# Patient Record
Sex: FEMALE | Race: ASIAN | NOT HISPANIC OR LATINO | Employment: UNEMPLOYED | URBAN - METROPOLITAN AREA
[De-identification: names, ages, dates, MRNs, and addresses within clinical notes are randomized per-mention and may not be internally consistent; named-entity substitution may affect disease eponyms.]

---

## 2018-01-01 ENCOUNTER — HOSPITAL ENCOUNTER (OUTPATIENT)
Dept: RADIOLOGY | Facility: HOSPITAL | Age: 53
Discharge: HOME | End: 2018-10-31
Attending: RADIOLOGY

## 2018-01-01 DIAGNOSIS — Z76.89 REFERRAL OF PATIENT WITHOUT EXAMINATION OR TREATMENT: ICD-10-CM

## 2019-01-01 ENCOUNTER — APPOINTMENT (INPATIENT)
Dept: RADIOLOGY | Facility: HOSPITAL | Age: 54
DRG: 208 | End: 2019-01-01
Payer: COMMERCIAL

## 2019-01-01 ENCOUNTER — APPOINTMENT (INPATIENT)
Dept: NON INVASIVE DIAGNOSTICS | Facility: HOSPITAL | Age: 54
DRG: 208 | End: 2019-01-01
Payer: COMMERCIAL

## 2019-01-01 ENCOUNTER — HOSPITAL ENCOUNTER (INPATIENT)
Facility: HOSPITAL | Age: 54
LOS: 3 days | DRG: 208 | End: 2019-07-31
Attending: EMERGENCY MEDICINE | Admitting: INTERNAL MEDICINE
Payer: COMMERCIAL

## 2019-01-01 ENCOUNTER — APPOINTMENT (EMERGENCY)
Dept: RADIOLOGY | Facility: HOSPITAL | Age: 54
DRG: 208 | End: 2019-01-01
Payer: COMMERCIAL

## 2019-01-01 ENCOUNTER — APPOINTMENT (INPATIENT)
Dept: NUCLEAR MEDICINE | Facility: HOSPITAL | Age: 54
DRG: 208 | End: 2019-01-01
Payer: COMMERCIAL

## 2019-01-01 ENCOUNTER — APPOINTMENT (INPATIENT)
Dept: CT IMAGING | Facility: HOSPITAL | Age: 54
DRG: 208 | End: 2019-01-01
Payer: COMMERCIAL

## 2019-01-01 DIAGNOSIS — I46.9 CARDIAC ARREST (HCC): Primary | ICD-10-CM

## 2019-01-01 DIAGNOSIS — J96.90 RESPIRATORY FAILURE (HCC): ICD-10-CM

## 2019-01-01 LAB
ABO GROUP BLD: NORMAL
ALBUMIN SERPL BCP-MCNC: 2.2 G/DL (ref 3.5–5)
ALBUMIN SERPL BCP-MCNC: 2.5 G/DL (ref 3.5–5)
ALBUMIN SERPL BCP-MCNC: 2.5 G/DL (ref 3.5–5)
ALBUMIN SERPL BCP-MCNC: 2.8 G/DL (ref 3.5–5)
ALP SERPL-CCNC: 47 U/L (ref 46–116)
ALP SERPL-CCNC: 55 U/L (ref 46–116)
ALP SERPL-CCNC: 59 U/L (ref 46–116)
ALP SERPL-CCNC: 59 U/L (ref 46–116)
ALT SERPL W P-5'-P-CCNC: 139 U/L (ref 12–78)
ALT SERPL W P-5'-P-CCNC: 149 U/L (ref 12–78)
ALT SERPL W P-5'-P-CCNC: 170 U/L (ref 12–78)
ALT SERPL W P-5'-P-CCNC: 25 U/L (ref 12–78)
ANION GAP BLD CALC-SCNC: 20 MMOL/L (ref 4–13)
ANION GAP SERPL CALCULATED.3IONS-SCNC: 10 MMOL/L (ref 4–13)
ANION GAP SERPL CALCULATED.3IONS-SCNC: 14 MMOL/L (ref 4–13)
ANION GAP SERPL CALCULATED.3IONS-SCNC: 14 MMOL/L (ref 4–13)
ANION GAP SERPL CALCULATED.3IONS-SCNC: 15 MMOL/L (ref 4–13)
ANION GAP SERPL CALCULATED.3IONS-SCNC: 21 MMOL/L (ref 4–13)
ANION GAP SERPL CALCULATED.3IONS-SCNC: 7 MMOL/L (ref 4–13)
ANION GAP SERPL CALCULATED.3IONS-SCNC: 8 MMOL/L (ref 4–13)
ANION GAP SERPL CALCULATED.3IONS-SCNC: 9 MMOL/L (ref 4–13)
ANION GAP SERPL CALCULATED.3IONS-SCNC: 9 MMOL/L (ref 4–13)
APTT PPP: 29 SECONDS (ref 23–37)
APTT PPP: 32 SECONDS (ref 23–37)
APTT PPP: 32 SECONDS (ref 23–37)
APTT PPP: 41 SECONDS (ref 23–37)
ARTERIAL PATENCY WRIST A: YES
AST SERPL W P-5'-P-CCNC: 152 U/L (ref 5–45)
AST SERPL W P-5'-P-CCNC: 254 U/L (ref 5–45)
AST SERPL W P-5'-P-CCNC: 281 U/L (ref 5–45)
AST SERPL W P-5'-P-CCNC: 31 U/L (ref 5–45)
ATRIAL RATE: 100 BPM
ATRIAL RATE: 102 BPM
ATRIAL RATE: 104 BPM
ATRIAL RATE: 119 BPM
ATRIAL RATE: 127 BPM
ATRIAL RATE: 136 BPM
ATRIAL RATE: 139 BPM
ATRIAL RATE: 78 BPM
ATRIAL RATE: 86 BPM
ATRIAL RATE: 93 BPM
BASE EXCESS BLDA CALC-SCNC: -0.1 MMOL/L
BASE EXCESS BLDA CALC-SCNC: -23.2 MMOL/L
BASE EXCESS BLDA CALC-SCNC: -24 MMOL/L (ref -2–3)
BASE EXCESS BLDA CALC-SCNC: -4.4 MMOL/L
BASE EXCESS BLDA CALC-SCNC: 0.1 MMOL/L
BASE EXCESS BLDA CALC-SCNC: 2.2 MMOL/L
BASE EXCESS BLDA CALC-SCNC: 2.7 MMOL/L
BASE EXCESS BLDA CALC-SCNC: 3 MMOL/L (ref -2–3)
BASE EXCESS BLDA CALC-SCNC: 3.4 MMOL/L
BASE EXCESS BLDA CALC-SCNC: 4.9 MMOL/L
BASE EXCESS BLDA CALC-SCNC: 5 MMOL/L (ref -2–3)
BASE EXCESS BLDA CALC-SCNC: 5.4 MMOL/L
BASOPHILS # BLD AUTO: 0.05 THOUSANDS/ΜL (ref 0–0.1)
BASOPHILS # BLD AUTO: 0.05 THOUSANDS/ΜL (ref 0–0.1)
BASOPHILS NFR BLD AUTO: 0 % (ref 0–1)
BASOPHILS NFR BLD AUTO: 0 % (ref 0–1)
BILIRUB SERPL-MCNC: 0.1 MG/DL (ref 0.2–1)
BILIRUB SERPL-MCNC: 0.15 MG/DL (ref 0.2–1)
BILIRUB SERPL-MCNC: 0.26 MG/DL (ref 0.2–1)
BILIRUB SERPL-MCNC: 0.27 MG/DL (ref 0.2–1)
BLD GP AB SCN SERPL QL: NEGATIVE
BODY TEMPERATURE: 93.4 DEGREES FEHRENHEIT
BODY TEMPERATURE: 93.6 DEGREES FEHRENHEIT
BODY TEMPERATURE: 93.6 DEGREES FEHRENHEIT
BODY TEMPERATURE: 94.1 DEGREES FEHRENHEIT
BUN BLD-MCNC: 23 MG/DL (ref 5–25)
BUN SERPL-MCNC: 15 MG/DL (ref 5–25)
BUN SERPL-MCNC: 17 MG/DL (ref 5–25)
BUN SERPL-MCNC: 19 MG/DL (ref 5–25)
BUN SERPL-MCNC: 19 MG/DL (ref 5–25)
BUN SERPL-MCNC: 20 MG/DL (ref 5–25)
BUN SERPL-MCNC: 20 MG/DL (ref 5–25)
BUN SERPL-MCNC: 21 MG/DL (ref 5–25)
BUN SERPL-MCNC: 22 MG/DL (ref 5–25)
BUN SERPL-MCNC: 23 MG/DL (ref 5–25)
CA-I BLD-SCNC: 0.84 MMOL/L (ref 1.12–1.32)
CA-I BLD-SCNC: 0.9 MMOL/L (ref 1.12–1.32)
CA-I BLD-SCNC: 0.9 MMOL/L (ref 1.12–1.32)
CA-I BLD-SCNC: 1.07 MMOL/L (ref 1.12–1.32)
CA-I BLD-SCNC: 1.11 MMOL/L (ref 1.12–1.32)
CA-I BLD-SCNC: 1.15 MMOL/L (ref 1.12–1.32)
CA-I BLD-SCNC: 1.17 MMOL/L (ref 1.12–1.32)
CALCIUM SERPL-MCNC: 6.4 MG/DL (ref 8.3–10.1)
CALCIUM SERPL-MCNC: 6.8 MG/DL (ref 8.3–10.1)
CALCIUM SERPL-MCNC: 6.9 MG/DL (ref 8.3–10.1)
CALCIUM SERPL-MCNC: 7.1 MG/DL (ref 8.3–10.1)
CALCIUM SERPL-MCNC: 7.8 MG/DL (ref 8.3–10.1)
CALCIUM SERPL-MCNC: 7.8 MG/DL (ref 8.3–10.1)
CALCIUM SERPL-MCNC: 8.2 MG/DL (ref 8.3–10.1)
CALCIUM SERPL-MCNC: 8.5 MG/DL (ref 8.3–10.1)
CALCIUM SERPL-MCNC: 9 MG/DL (ref 8.3–10.1)
CHLORIDE BLD-SCNC: 107 MMOL/L (ref 100–108)
CHLORIDE SERPL-SCNC: 102 MMOL/L (ref 100–108)
CHLORIDE SERPL-SCNC: 103 MMOL/L (ref 100–108)
CHLORIDE SERPL-SCNC: 103 MMOL/L (ref 100–108)
CHLORIDE SERPL-SCNC: 105 MMOL/L (ref 100–108)
CHLORIDE SERPL-SCNC: 106 MMOL/L (ref 100–108)
CHLORIDE SERPL-SCNC: 107 MMOL/L (ref 100–108)
CHLORIDE SERPL-SCNC: 108 MMOL/L (ref 100–108)
CHLORIDE SERPL-SCNC: 110 MMOL/L (ref 100–108)
CHLORIDE SERPL-SCNC: 129 MMOL/L (ref 100–108)
CO2 SERPL-SCNC: 14 MMOL/L (ref 21–32)
CO2 SERPL-SCNC: 24 MMOL/L (ref 21–32)
CO2 SERPL-SCNC: 25 MMOL/L (ref 21–32)
CO2 SERPL-SCNC: 27 MMOL/L (ref 21–32)
CO2 SERPL-SCNC: 28 MMOL/L (ref 21–32)
CO2 SERPL-SCNC: 29 MMOL/L (ref 21–32)
CO2 SERPL-SCNC: 29 MMOL/L (ref 21–32)
CO2 SERPL-SCNC: 30 MMOL/L (ref 21–32)
CO2 SERPL-SCNC: 30 MMOL/L (ref 21–32)
CREAT BLD-MCNC: 1.2 MG/DL (ref 0.6–1.3)
CREAT SERPL-MCNC: 0.88 MG/DL (ref 0.6–1.3)
CREAT SERPL-MCNC: 1.01 MG/DL (ref 0.6–1.3)
CREAT SERPL-MCNC: 1.22 MG/DL (ref 0.6–1.3)
CREAT SERPL-MCNC: 1.23 MG/DL (ref 0.6–1.3)
CREAT SERPL-MCNC: 1.29 MG/DL (ref 0.6–1.3)
CREAT SERPL-MCNC: 1.5 MG/DL (ref 0.6–1.3)
CREAT SERPL-MCNC: 1.53 MG/DL (ref 0.6–1.3)
CREAT SERPL-MCNC: 1.58 MG/DL (ref 0.6–1.3)
CREAT SERPL-MCNC: 1.8 MG/DL (ref 0.6–1.3)
DS:DELIVERY SYSTEM: 18
DS:DELIVERY SYSTEM: 18
EOSINOPHIL # BLD AUTO: 0.08 THOUSAND/ΜL (ref 0–0.61)
EOSINOPHIL # BLD AUTO: 1.26 THOUSAND/ΜL (ref 0–0.61)
EOSINOPHIL NFR BLD AUTO: 0 % (ref 0–6)
EOSINOPHIL NFR BLD AUTO: 10 % (ref 0–6)
ERYTHROCYTE [DISTWIDTH] IN BLOOD BY AUTOMATED COUNT: 14.4 % (ref 11.6–15.1)
ERYTHROCYTE [DISTWIDTH] IN BLOOD BY AUTOMATED COUNT: 14.5 % (ref 11.6–15.1)
ERYTHROCYTE [DISTWIDTH] IN BLOOD BY AUTOMATED COUNT: 14.5 % (ref 11.6–15.1)
FIO2 GAS DIL.REBREATH: 100 L
FIO2 GAS DIL.REBREATH: 100 L
GFR SERPL CREATININE-BSD FRML MDRD: 31 ML/MIN/1.73SQ M
GFR SERPL CREATININE-BSD FRML MDRD: 37 ML/MIN/1.73SQ M
GFR SERPL CREATININE-BSD FRML MDRD: 38 ML/MIN/1.73SQ M
GFR SERPL CREATININE-BSD FRML MDRD: 39 ML/MIN/1.73SQ M
GFR SERPL CREATININE-BSD FRML MDRD: 47 ML/MIN/1.73SQ M
GFR SERPL CREATININE-BSD FRML MDRD: 50 ML/MIN/1.73SQ M
GFR SERPL CREATININE-BSD FRML MDRD: 50 ML/MIN/1.73SQ M
GFR SERPL CREATININE-BSD FRML MDRD: 51 ML/MIN/1.73SQ M
GFR SERPL CREATININE-BSD FRML MDRD: 63 ML/MIN/1.73SQ M
GFR SERPL CREATININE-BSD FRML MDRD: 75 ML/MIN/1.73SQ M
GLUCOSE SERPL-MCNC: 101 MG/DL (ref 65–140)
GLUCOSE SERPL-MCNC: 102 MG/DL (ref 65–140)
GLUCOSE SERPL-MCNC: 103 MG/DL (ref 65–140)
GLUCOSE SERPL-MCNC: 105 MG/DL (ref 65–140)
GLUCOSE SERPL-MCNC: 120 MG/DL (ref 65–140)
GLUCOSE SERPL-MCNC: 125 MG/DL (ref 65–140)
GLUCOSE SERPL-MCNC: 137 MG/DL (ref 65–140)
GLUCOSE SERPL-MCNC: 139 MG/DL (ref 65–140)
GLUCOSE SERPL-MCNC: 141 MG/DL (ref 65–140)
GLUCOSE SERPL-MCNC: 146 MG/DL (ref 65–140)
GLUCOSE SERPL-MCNC: 147 MG/DL (ref 65–140)
GLUCOSE SERPL-MCNC: 163 MG/DL (ref 65–140)
GLUCOSE SERPL-MCNC: 167 MG/DL (ref 65–140)
GLUCOSE SERPL-MCNC: 168 MG/DL (ref 65–140)
GLUCOSE SERPL-MCNC: 173 MG/DL (ref 65–140)
GLUCOSE SERPL-MCNC: 174 MG/DL (ref 65–140)
GLUCOSE SERPL-MCNC: 176 MG/DL (ref 65–140)
GLUCOSE SERPL-MCNC: 179 MG/DL (ref 65–140)
GLUCOSE SERPL-MCNC: 179 MG/DL (ref 65–140)
GLUCOSE SERPL-MCNC: 183 MG/DL (ref 65–140)
GLUCOSE SERPL-MCNC: 184 MG/DL (ref 65–140)
GLUCOSE SERPL-MCNC: 185 MG/DL (ref 65–140)
GLUCOSE SERPL-MCNC: 193 MG/DL (ref 65–140)
GLUCOSE SERPL-MCNC: 196 MG/DL (ref 65–140)
GLUCOSE SERPL-MCNC: 217 MG/DL (ref 65–140)
GLUCOSE SERPL-MCNC: 273 MG/DL (ref 65–140)
GLUCOSE SERPL-MCNC: 343 MG/DL (ref 65–140)
GLUCOSE SERPL-MCNC: 351 MG/DL (ref 65–140)
GLUCOSE SERPL-MCNC: 356 MG/DL (ref 65–140)
GLUCOSE SERPL-MCNC: 386 MG/DL (ref 65–140)
GLUCOSE SERPL-MCNC: 418 MG/DL (ref 65–140)
GLUCOSE SERPL-MCNC: 454 MG/DL (ref 65–140)
GLUCOSE SERPL-MCNC: 47 MG/DL (ref 65–140)
HCO3 BLDA-SCNC: 10.6 MMOL/L (ref 22–28)
HCO3 BLDA-SCNC: 11 MMOL/L (ref 24–30)
HCO3 BLDA-SCNC: 22.1 MMOL/L (ref 22–28)
HCO3 BLDA-SCNC: 24.5 MMOL/L (ref 22–28)
HCO3 BLDA-SCNC: 25.4 MMOL/L (ref 22–28)
HCO3 BLDA-SCNC: 26 MMOL/L (ref 22–28)
HCO3 BLDA-SCNC: 27.1 MMOL/L (ref 22–28)
HCO3 BLDA-SCNC: 28.7 MMOL/L (ref 22–28)
HCO3 BLDA-SCNC: 29.2 MMOL/L (ref 22–28)
HCO3 BLDA-SCNC: 30.3 MMOL/L (ref 22–28)
HCO3 BLDA-SCNC: 30.6 MMOL/L (ref 22–28)
HCO3 BLDA-SCNC: 32.3 MMOL/L (ref 22–28)
HCT VFR BLD AUTO: 41.6 % (ref 34.8–46.1)
HCT VFR BLD AUTO: 42.9 % (ref 34.8–46.1)
HCT VFR BLD AUTO: 44.1 % (ref 34.8–46.1)
HCT VFR BLD CALC: 35 % (ref 34.8–46.1)
HCT VFR BLD CALC: 36 % (ref 34.8–46.1)
HCT VFR BLD CALC: 40 % (ref 34.8–46.1)
HGB BLD-MCNC: 11.4 G/DL (ref 11.5–15.4)
HGB BLD-MCNC: 12.6 G/DL (ref 11.5–15.4)
HGB BLD-MCNC: 13 G/DL (ref 11.5–15.4)
HGB BLDA-MCNC: 11.9 G/DL (ref 11.5–15.4)
HGB BLDA-MCNC: 12.2 G/DL (ref 11.5–15.4)
HGB BLDA-MCNC: 13.6 G/DL (ref 11.5–15.4)
HOROWITZ INDEX BLDA+IHG-RTO: 100 MM[HG]
HOROWITZ INDEX BLDA+IHG-RTO: 40 MM[HG]
HOROWITZ INDEX BLDA+IHG-RTO: 50 MM[HG]
HOROWITZ INDEX BLDA+IHG-RTO: 80 MM[HG]
IMM GRANULOCYTES # BLD AUTO: 0.2 THOUSAND/UL (ref 0–0.2)
IMM GRANULOCYTES # BLD AUTO: 0.38 THOUSAND/UL (ref 0–0.2)
IMM GRANULOCYTES NFR BLD AUTO: 2 % (ref 0–2)
IMM GRANULOCYTES NFR BLD AUTO: 2 % (ref 0–2)
INR PPP: 0.85 (ref 0.84–1.19)
INR PPP: 0.87 (ref 0.84–1.19)
INR PPP: 0.93 (ref 0.84–1.19)
INR PPP: 0.97 (ref 0.84–1.19)
INR PPP: 1.06 (ref 0.84–1.19)
INR PPP: 1.29 (ref 0.84–1.19)
LACTATE SERPL-SCNC: 1.7 MMOL/L (ref 0.5–2)
LACTATE SERPL-SCNC: 14.4 MMOL/L (ref 0.5–2)
LACTATE SERPL-SCNC: 3.7 MMOL/L (ref 0.5–2)
LACTATE SERPL-SCNC: 4.6 MMOL/L (ref 0.5–2)
LACTATE SERPL-SCNC: 6.8 MMOL/L (ref 0.5–2)
LYMPHOCYTES # BLD AUTO: 0.63 THOUSANDS/ΜL (ref 0.6–4.47)
LYMPHOCYTES # BLD AUTO: 5.58 THOUSANDS/ΜL (ref 0.6–4.47)
LYMPHOCYTES NFR BLD AUTO: 3 % (ref 14–44)
LYMPHOCYTES NFR BLD AUTO: 45 % (ref 14–44)
MAGNESIUM SERPL-MCNC: 2 MG/DL (ref 1.6–2.6)
MAGNESIUM SERPL-MCNC: 2 MG/DL (ref 1.6–2.6)
MAGNESIUM SERPL-MCNC: 2.2 MG/DL (ref 1.6–2.6)
MAGNESIUM SERPL-MCNC: 2.3 MG/DL (ref 1.6–2.6)
MAGNESIUM SERPL-MCNC: 2.7 MG/DL (ref 1.6–2.6)
MAGNESIUM SERPL-MCNC: 2.7 MG/DL (ref 1.6–2.6)
MAGNESIUM SERPL-MCNC: 3.1 MG/DL (ref 1.6–2.6)
MCH RBC QN AUTO: 22 PG (ref 26.8–34.3)
MCHC RBC AUTO-ENTMCNC: 26.6 G/DL (ref 31.4–37.4)
MCHC RBC AUTO-ENTMCNC: 29.5 G/DL (ref 31.4–37.4)
MCHC RBC AUTO-ENTMCNC: 30.3 G/DL (ref 31.4–37.4)
MCV RBC AUTO: 73 FL (ref 82–98)
MCV RBC AUTO: 75 FL (ref 82–98)
MCV RBC AUTO: 83 FL (ref 82–98)
MONOCYTES # BLD AUTO: 0.48 THOUSAND/ΜL (ref 0.17–1.22)
MONOCYTES # BLD AUTO: 0.6 THOUSAND/ΜL (ref 0.17–1.22)
MONOCYTES NFR BLD AUTO: 3 % (ref 4–12)
MONOCYTES NFR BLD AUTO: 4 % (ref 4–12)
NEUTROPHILS # BLD AUTO: 22.7 THOUSANDS/ΜL (ref 1.85–7.62)
NEUTROPHILS # BLD AUTO: 4.84 THOUSANDS/ΜL (ref 1.85–7.62)
NEUTS SEG NFR BLD AUTO: 39 % (ref 43–75)
NEUTS SEG NFR BLD AUTO: 92 % (ref 43–75)
NRBC BLD AUTO-RTO: 0 /100 WBCS
NRBC BLD AUTO-RTO: 1 /100 WBCS
NT-PROBNP SERPL-MCNC: 183 PG/ML
O2 CT BLDA-SCNC: 16.1 ML/DL (ref 16–23)
O2 CT BLDA-SCNC: 16.5 ML/DL (ref 16–23)
O2 CT BLDA-SCNC: 17.7 ML/DL (ref 16–23)
O2 CT BLDA-SCNC: 17.8 ML/DL (ref 16–23)
O2 CT BLDA-SCNC: 18.1 ML/DL (ref 16–23)
O2 CT BLDA-SCNC: 18.3 ML/DL (ref 16–23)
O2 CT BLDA-SCNC: 18.4 ML/DL (ref 16–23)
O2 CT BLDA-SCNC: 18.7 ML/DL (ref 16–23)
O2 CT BLDA-SCNC: 19.3 ML/DL (ref 16–23)
OXYHGB MFR BLDA: 94.3 % (ref 94–97)
OXYHGB MFR BLDA: 94.3 % (ref 94–97)
OXYHGB MFR BLDA: 95.6 % (ref 94–97)
OXYHGB MFR BLDA: 96.7 % (ref 94–97)
OXYHGB MFR BLDA: 97.3 % (ref 94–97)
OXYHGB MFR BLDA: 97.3 % (ref 94–97)
OXYHGB MFR BLDA: 97.7 % (ref 94–97)
OXYHGB MFR BLDA: 97.9 % (ref 94–97)
OXYHGB MFR BLDA: 97.9 % (ref 94–97)
P AXIS: 0 DEGREES
P AXIS: 59 DEGREES
P AXIS: 63 DEGREES
P AXIS: 64 DEGREES
P AXIS: 65 DEGREES
P AXIS: 67 DEGREES
P AXIS: 70 DEGREES
P AXIS: 74 DEGREES
P AXIS: 78 DEGREES
P AXIS: 80 DEGREES
PCO2 BLD: 13 MMOL/L (ref 21–32)
PCO2 BLD: 15 MMOL/L (ref 21–32)
PCO2 BLD: 32 MMOL/L (ref 21–32)
PCO2 BLD: 35 MMOL/L (ref 21–32)
PCO2 BLD: 49.8 MM HG (ref 36–44)
PCO2 BLD: 65.7 MM HG (ref 42–50)
PCO2 BLD: 74.8 MM HG (ref 36–44)
PCO2 BLDA: 33.2 MM HG (ref 36–44)
PCO2 BLDA: 38.8 MM HG (ref 36–44)
PCO2 BLDA: 41.8 MM HG (ref 36–44)
PCO2 BLDA: 42.9 MM HG (ref 36–44)
PCO2 BLDA: 45.8 MM HG (ref 36–44)
PCO2 BLDA: 46.2 MM HG (ref 36–44)
PCO2 BLDA: 46.7 MM HG (ref 36–44)
PCO2 BLDA: 47.7 MM HG (ref 36–44)
PCO2 BLDA: 62.3 MM HG (ref 36–44)
PCO2 TEMP ADJ BLDA: 29.4 MM HG (ref 36–44)
PCO2 TEMP ADJ BLDA: 37 MM HG (ref 36–44)
PCO2 TEMP ADJ BLDA: 41.1 MM HG (ref 36–44)
PEEP RESPIRATORY: 5 CM[H2O]
PH BLD: 6.83 [PH] (ref 7.3–7.4)
PH BLD: 7.24 [PH] (ref 7.35–7.45)
PH BLD: 7.4 [PH] (ref 7.35–7.45)
PH BLD: 7.48 [PH] (ref 7.35–7.45)
PH BLD: 7.5 [PH] (ref 7.35–7.45)
PH BLD: 7.54 [PH] (ref 7.35–7.45)
PH BLDA: 6.85 [PH] (ref 7.35–7.45)
PH BLDA: 7.3 [PH] (ref 7.35–7.45)
PH BLDA: 7.35 [PH] (ref 7.35–7.45)
PH BLDA: 7.41 [PH] (ref 7.35–7.45)
PH BLDA: 7.42 [PH] (ref 7.35–7.45)
PH BLDA: 7.42 [PH] (ref 7.35–7.45)
PH BLDA: 7.44 [PH] (ref 7.35–7.45)
PH BLDA: 7.46 [PH] (ref 7.35–7.45)
PH BLDA: 7.5 [PH] (ref 7.35–7.45)
PHOSPHATE SERPL-MCNC: 1.9 MG/DL (ref 2.7–4.5)
PHOSPHATE SERPL-MCNC: 2.9 MG/DL (ref 2.7–4.5)
PHOSPHATE SERPL-MCNC: 5 MG/DL (ref 2.7–4.5)
PHOSPHATE SERPL-MCNC: 5.2 MG/DL (ref 2.7–4.5)
PHOSPHATE SERPL-MCNC: 5.7 MG/DL (ref 2.7–4.5)
PLATELET # BLD AUTO: 248 THOUSANDS/UL (ref 149–390)
PLATELET # BLD AUTO: 260 THOUSANDS/UL (ref 149–390)
PLATELET # BLD AUTO: 265 THOUSANDS/UL (ref 149–390)
PLATELET # BLD AUTO: 324 THOUSANDS/UL (ref 149–390)
PMV BLD AUTO: 10.3 FL (ref 8.9–12.7)
PMV BLD AUTO: 11.7 FL (ref 8.9–12.7)
PMV BLD AUTO: 11.8 FL (ref 8.9–12.7)
PMV BLD AUTO: 12.8 FL (ref 8.9–12.7)
PO2 BLD: 116.8 MM HG (ref 75–129)
PO2 BLD: 119.8 MM HG (ref 75–129)
PO2 BLD: 144.5 MM HG (ref 75–129)
PO2 BLD: 268 MM HG (ref 75–129)
PO2 BLD: 343 MM HG (ref 75–129)
PO2 BLD: 47 MM HG (ref 35–45)
PO2 BLDA: 120.7 MM HG (ref 75–129)
PO2 BLDA: 131.4 MM HG (ref 75–129)
PO2 BLDA: 136 MM HG (ref 75–129)
PO2 BLDA: 144.5 MM HG (ref 75–129)
PO2 BLDA: 159.9 MM HG (ref 75–129)
PO2 BLDA: 223.2 MM HG (ref 75–129)
PO2 BLDA: 229.9 MM HG (ref 75–129)
PO2 BLDA: 85.9 MM HG (ref 75–129)
PO2 BLDA: 97.7 MM HG (ref 75–129)
POTASSIUM BLD-SCNC: 4 MMOL/L (ref 3.5–5.3)
POTASSIUM BLD-SCNC: 4.3 MMOL/L (ref 3.5–5.3)
POTASSIUM BLD-SCNC: 5.5 MMOL/L (ref 3.5–5.3)
POTASSIUM SERPL-SCNC: 2.9 MMOL/L (ref 3.5–5.3)
POTASSIUM SERPL-SCNC: 3.1 MMOL/L (ref 3.5–5.3)
POTASSIUM SERPL-SCNC: 3.6 MMOL/L (ref 3.5–5.3)
POTASSIUM SERPL-SCNC: 3.8 MMOL/L (ref 3.5–5.3)
POTASSIUM SERPL-SCNC: 4.1 MMOL/L (ref 3.5–5.3)
POTASSIUM SERPL-SCNC: 4.3 MMOL/L (ref 3.5–5.3)
POTASSIUM SERPL-SCNC: 4.5 MMOL/L (ref 3.5–5.3)
POTASSIUM SERPL-SCNC: 4.5 MMOL/L (ref 3.5–5.3)
POTASSIUM SERPL-SCNC: 5.8 MMOL/L (ref 3.5–5.3)
PR INTERVAL: 113 MS
PR INTERVAL: 114 MS
PR INTERVAL: 117 MS
PR INTERVAL: 118 MS
PR INTERVAL: 138 MS
PR INTERVAL: 146 MS
PR INTERVAL: 175 MS
PR INTERVAL: 232 MS
PROCALCITONIN SERPL-MCNC: 2.7 NG/ML
PROT SERPL-MCNC: 5.1 G/DL (ref 6.4–8.2)
PROT SERPL-MCNC: 5.2 G/DL (ref 6.4–8.2)
PROT SERPL-MCNC: 5.7 G/DL (ref 6.4–8.2)
PROT SERPL-MCNC: 5.7 G/DL (ref 6.4–8.2)
PROTHROMBIN TIME: 11.7 SECONDS (ref 11.6–14.5)
PROTHROMBIN TIME: 11.9 SECONDS (ref 11.6–14.5)
PROTHROMBIN TIME: 12.6 SECONDS (ref 11.6–14.5)
PROTHROMBIN TIME: 13 SECONDS (ref 11.6–14.5)
PROTHROMBIN TIME: 13.9 SECONDS (ref 11.6–14.5)
PROTHROMBIN TIME: 16.3 SECONDS (ref 11.6–14.5)
PS VENT FIO2: 40
PS VENT PEEP: 5
QRS AXIS: 10 DEGREES
QRS AXIS: 102 DEGREES
QRS AXIS: 203 DEGREES
QRS AXIS: 34 DEGREES
QRS AXIS: 36 DEGREES
QRS AXIS: 37 DEGREES
QRS AXIS: 41 DEGREES
QRS AXIS: 41 DEGREES
QRS AXIS: 55 DEGREES
QRS AXIS: 82 DEGREES
QRSD INTERVAL: 104 MS
QRSD INTERVAL: 134 MS
QRSD INTERVAL: 72 MS
QRSD INTERVAL: 79 MS
QRSD INTERVAL: 82 MS
QRSD INTERVAL: 88 MS
QRSD INTERVAL: 88 MS
QRSD INTERVAL: 96 MS
QT INTERVAL: 275 MS
QT INTERVAL: 280 MS
QT INTERVAL: 308 MS
QT INTERVAL: 313 MS
QT INTERVAL: 314 MS
QT INTERVAL: 328 MS
QT INTERVAL: 392 MS
QT INTERVAL: 517 MS
QT INTERVAL: 529 MS
QT INTERVAL: 571 MS
QTC INTERVAL: 404 MS
QTC INTERVAL: 406 MS
QTC INTERVAL: 414 MS
QTC INTERVAL: 415 MS
QTC INTERVAL: 426 MS
QTC INTERVAL: 476 MS
QTC INTERVAL: 511 MS
QTC INTERVAL: 619 MS
QTC INTERVAL: 651 MS
QTC INTERVAL: 659 MS
RBC # BLD AUTO: 5.18 MILLION/UL (ref 3.81–5.12)
RBC # BLD AUTO: 5.72 MILLION/UL (ref 3.81–5.12)
RBC # BLD AUTO: 5.9 MILLION/UL (ref 3.81–5.12)
RH BLD: POSITIVE
SAO2 % BLD FROM PO2: 100 % (ref 95–98)
SAO2 % BLD FROM PO2: 100 % (ref 95–98)
SAO2 % BLD FROM PO2: 48 % (ref 95–98)
SODIUM BLD-SCNC: 135 MMOL/L (ref 136–145)
SODIUM BLD-SCNC: 153 MMOL/L (ref 136–145)
SODIUM BLD-SCNC: 156 MMOL/L (ref 136–145)
SODIUM SERPL-SCNC: 139 MMOL/L (ref 136–145)
SODIUM SERPL-SCNC: 140 MMOL/L (ref 136–145)
SODIUM SERPL-SCNC: 141 MMOL/L (ref 136–145)
SODIUM SERPL-SCNC: 142 MMOL/L (ref 136–145)
SODIUM SERPL-SCNC: 145 MMOL/L (ref 136–145)
SODIUM SERPL-SCNC: 147 MMOL/L (ref 136–145)
SODIUM SERPL-SCNC: 148 MMOL/L (ref 136–145)
SODIUM SERPL-SCNC: 149 MMOL/L (ref 136–145)
SODIUM SERPL-SCNC: 165 MMOL/L (ref 136–145)
SPECIMEN EXPIRATION DATE: NORMAL
SPECIMEN SOURCE: ABNORMAL
SPECIMEN SOURCE: NORMAL
T WAVE AXIS: 124 DEGREES
T WAVE AXIS: 179 DEGREES
T WAVE AXIS: 200 DEGREES
T WAVE AXIS: 210 DEGREES
T WAVE AXIS: 212 DEGREES
T WAVE AXIS: 213 DEGREES
T WAVE AXIS: 257 DEGREES
T WAVE AXIS: 270 DEGREES
T WAVE AXIS: 46 DEGREES
T WAVE AXIS: 52 DEGREES
TROPONIN I SERPL-MCNC: <0.02 NG/ML
VENT - PS: ABNORMAL
VENT AC: 18
VENT AC: 18
VENT AC: 22
VENT AC: 28
VENT- AC: AC
VENTILATION VALUE: 380
VENTILATION VALUE: 380
VENTRICULAR RATE: 100 BPM
VENTRICULAR RATE: 102 BPM
VENTRICULAR RATE: 104 BPM
VENTRICULAR RATE: 105 BPM
VENTRICULAR RATE: 127 BPM
VENTRICULAR RATE: 136 BPM
VENTRICULAR RATE: 139 BPM
VENTRICULAR RATE: 78 BPM
VENTRICULAR RATE: 86 BPM
VENTRICULAR RATE: 93 BPM
VT SETTING VENT: 380 ML
VT SETTING VENT: 380 ML
VT SETTING VENT: 400 ML
VT SETTING VENT: 450 ML
WBC # BLD AUTO: 12.41 THOUSAND/UL (ref 4.31–10.16)
WBC # BLD AUTO: 24.44 THOUSAND/UL (ref 4.31–10.16)
WBC # BLD AUTO: 26.73 THOUSAND/UL (ref 4.31–10.16)

## 2019-01-01 PROCEDURE — 80047 BASIC METABLC PNL IONIZED CA: CPT

## 2019-01-01 PROCEDURE — 36415 COLL VENOUS BLD VENIPUNCTURE: CPT | Performed by: EMERGENCY MEDICINE

## 2019-01-01 PROCEDURE — 96375 TX/PRO/DX INJ NEW DRUG ADDON: CPT

## 2019-01-01 PROCEDURE — 83735 ASSAY OF MAGNESIUM: CPT | Performed by: PHYSICIAN ASSISTANT

## 2019-01-01 PROCEDURE — 83605 ASSAY OF LACTIC ACID: CPT | Performed by: PHYSICIAN ASSISTANT

## 2019-01-01 PROCEDURE — 82948 REAGENT STRIP/BLOOD GLUCOSE: CPT

## 2019-01-01 PROCEDURE — 82947 ASSAY GLUCOSE BLOOD QUANT: CPT

## 2019-01-01 PROCEDURE — 86850 RBC ANTIBODY SCREEN: CPT | Performed by: NURSE PRACTITIONER

## 2019-01-01 PROCEDURE — 03HY32Z INSERTION OF MONITORING DEVICE INTO UPPER ARTERY, PERCUTANEOUS APPROACH: ICD-10-PCS | Performed by: INTERNAL MEDICINE

## 2019-01-01 PROCEDURE — 85730 THROMBOPLASTIN TIME PARTIAL: CPT | Performed by: PHYSICIAN ASSISTANT

## 2019-01-01 PROCEDURE — 5A1945Z RESPIRATORY VENTILATION, 24-96 CONSECUTIVE HOURS: ICD-10-PCS | Performed by: EMERGENCY MEDICINE

## 2019-01-01 PROCEDURE — 4A133B1 MONITORING OF ARTERIAL PRESSURE, PERIPHERAL, PERCUTANEOUS APPROACH: ICD-10-PCS | Performed by: INTERNAL MEDICINE

## 2019-01-01 PROCEDURE — 85025 COMPLETE CBC W/AUTO DIFF WBC: CPT | Performed by: EMERGENCY MEDICINE

## 2019-01-01 PROCEDURE — 93010 ELECTROCARDIOGRAM REPORT: CPT | Performed by: INTERNAL MEDICINE

## 2019-01-01 PROCEDURE — 87040 BLOOD CULTURE FOR BACTERIA: CPT | Performed by: EMERGENCY MEDICINE

## 2019-01-01 PROCEDURE — 82805 BLOOD GASES W/O2 SATURATION: CPT | Performed by: INTERNAL MEDICINE

## 2019-01-01 PROCEDURE — 85610 PROTHROMBIN TIME: CPT | Performed by: INTERNAL MEDICINE

## 2019-01-01 PROCEDURE — 4A133J1 MONITORING OF ARTERIAL PULSE, PERIPHERAL, PERCUTANEOUS APPROACH: ICD-10-PCS | Performed by: INTERNAL MEDICINE

## 2019-01-01 PROCEDURE — 84145 PROCALCITONIN (PCT): CPT | Performed by: PHYSICIAN ASSISTANT

## 2019-01-01 PROCEDURE — 71045 X-RAY EXAM CHEST 1 VIEW: CPT

## 2019-01-01 PROCEDURE — 94644 CONT INHLJ TX 1ST HOUR: CPT

## 2019-01-01 PROCEDURE — 93005 ELECTROCARDIOGRAM TRACING: CPT

## 2019-01-01 PROCEDURE — NC001 PR NO CHARGE: Performed by: NURSE PRACTITIONER

## 2019-01-01 PROCEDURE — 80048 BASIC METABOLIC PNL TOTAL CA: CPT | Performed by: PHYSICIAN ASSISTANT

## 2019-01-01 PROCEDURE — 80053 COMPREHEN METABOLIC PANEL: CPT | Performed by: EMERGENCY MEDICINE

## 2019-01-01 PROCEDURE — 99292 CRITICAL CARE ADDL 30 MIN: CPT | Performed by: INTERNAL MEDICINE

## 2019-01-01 PROCEDURE — 84295 ASSAY OF SERUM SODIUM: CPT

## 2019-01-01 PROCEDURE — 99285 EMERGENCY DEPT VISIT HI MDM: CPT | Performed by: EMERGENCY MEDICINE

## 2019-01-01 PROCEDURE — 84100 ASSAY OF PHOSPHORUS: CPT | Performed by: PHYSICIAN ASSISTANT

## 2019-01-01 PROCEDURE — 96365 THER/PROPH/DIAG IV INF INIT: CPT

## 2019-01-01 PROCEDURE — 82330 ASSAY OF CALCIUM: CPT | Performed by: PHYSICIAN ASSISTANT

## 2019-01-01 PROCEDURE — 70450 CT HEAD/BRAIN W/O DYE: CPT

## 2019-01-01 PROCEDURE — 85610 PROTHROMBIN TIME: CPT | Performed by: EMERGENCY MEDICINE

## 2019-01-01 PROCEDURE — 85730 THROMBOPLASTIN TIME PARTIAL: CPT | Performed by: EMERGENCY MEDICINE

## 2019-01-01 PROCEDURE — 36620 INSERTION CATHETER ARTERY: CPT

## 2019-01-01 PROCEDURE — 85049 AUTOMATED PLATELET COUNT: CPT | Performed by: PHYSICIAN ASSISTANT

## 2019-01-01 PROCEDURE — 83605 ASSAY OF LACTIC ACID: CPT | Performed by: EMERGENCY MEDICINE

## 2019-01-01 PROCEDURE — 36620 INSERTION CATHETER ARTERY: CPT | Performed by: NURSE PRACTITIONER

## 2019-01-01 PROCEDURE — 86920 COMPATIBILITY TEST SPIN: CPT

## 2019-01-01 PROCEDURE — 36600 WITHDRAWAL OF ARTERIAL BLOOD: CPT

## 2019-01-01 PROCEDURE — A9521 TC99M EXAMETAZIME: HCPCS

## 2019-01-01 PROCEDURE — 5A12012 PERFORMANCE OF CARDIAC OUTPUT, SINGLE, MANUAL: ICD-10-PCS | Performed by: EMERGENCY MEDICINE

## 2019-01-01 PROCEDURE — 82330 ASSAY OF CALCIUM: CPT | Performed by: INTERNAL MEDICINE

## 2019-01-01 PROCEDURE — 85014 HEMATOCRIT: CPT

## 2019-01-01 PROCEDURE — 80053 COMPREHEN METABOLIC PANEL: CPT | Performed by: PHYSICIAN ASSISTANT

## 2019-01-01 PROCEDURE — 94640 AIRWAY INHALATION TREATMENT: CPT

## 2019-01-01 PROCEDURE — 82803 BLOOD GASES ANY COMBINATION: CPT

## 2019-01-01 PROCEDURE — 84132 ASSAY OF SERUM POTASSIUM: CPT

## 2019-01-01 PROCEDURE — 80048 BASIC METABOLIC PNL TOTAL CA: CPT | Performed by: INTERNAL MEDICINE

## 2019-01-01 PROCEDURE — 82805 BLOOD GASES W/O2 SATURATION: CPT | Performed by: NURSE PRACTITIONER

## 2019-01-01 PROCEDURE — 94003 VENT MGMT INPAT SUBQ DAY: CPT

## 2019-01-01 PROCEDURE — 85610 PROTHROMBIN TIME: CPT | Performed by: PHYSICIAN ASSISTANT

## 2019-01-01 PROCEDURE — 94002 VENT MGMT INPAT INIT DAY: CPT

## 2019-01-01 PROCEDURE — 96376 TX/PRO/DX INJ SAME DRUG ADON: CPT

## 2019-01-01 PROCEDURE — 99291 CRITICAL CARE FIRST HOUR: CPT | Performed by: PHYSICIAN ASSISTANT

## 2019-01-01 PROCEDURE — 83735 ASSAY OF MAGNESIUM: CPT | Performed by: INTERNAL MEDICINE

## 2019-01-01 PROCEDURE — 86901 BLOOD TYPING SEROLOGIC RH(D): CPT | Performed by: NURSE PRACTITIONER

## 2019-01-01 PROCEDURE — 78606 BRAIN IMAGE W/FLOW 4 + VIEWS: CPT

## 2019-01-01 PROCEDURE — 84484 ASSAY OF TROPONIN QUANT: CPT | Performed by: EMERGENCY MEDICINE

## 2019-01-01 PROCEDURE — 83880 ASSAY OF NATRIURETIC PEPTIDE: CPT | Performed by: EMERGENCY MEDICINE

## 2019-01-01 PROCEDURE — 80053 COMPREHEN METABOLIC PANEL: CPT | Performed by: NURSE PRACTITIONER

## 2019-01-01 PROCEDURE — 85027 COMPLETE CBC AUTOMATED: CPT | Performed by: INTERNAL MEDICINE

## 2019-01-01 PROCEDURE — 83735 ASSAY OF MAGNESIUM: CPT | Performed by: EMERGENCY MEDICINE

## 2019-01-01 PROCEDURE — 85025 COMPLETE CBC W/AUTO DIFF WBC: CPT | Performed by: PHYSICIAN ASSISTANT

## 2019-01-01 PROCEDURE — NC001 PR NO CHARGE: Performed by: INTERNAL MEDICINE

## 2019-01-01 PROCEDURE — 92950 HEART/LUNG RESUSCITATION CPR: CPT

## 2019-01-01 PROCEDURE — 99291 CRITICAL CARE FIRST HOUR: CPT

## 2019-01-01 PROCEDURE — 82330 ASSAY OF CALCIUM: CPT

## 2019-01-01 PROCEDURE — 84100 ASSAY OF PHOSPHORUS: CPT | Performed by: INTERNAL MEDICINE

## 2019-01-01 PROCEDURE — 82805 BLOOD GASES W/O2 SATURATION: CPT | Performed by: EMERGENCY MEDICINE

## 2019-01-01 PROCEDURE — 99291 CRITICAL CARE FIRST HOUR: CPT | Performed by: INTERNAL MEDICINE

## 2019-01-01 PROCEDURE — 87040 BLOOD CULTURE FOR BACTERIA: CPT | Performed by: NURSE PRACTITIONER

## 2019-01-01 PROCEDURE — 94645 CONT INHLJ TX EACH ADDL HOUR: CPT

## 2019-01-01 PROCEDURE — 36556 INSERT NON-TUNNEL CV CATH: CPT | Performed by: NURSE PRACTITIONER

## 2019-01-01 PROCEDURE — 86900 BLOOD TYPING SEROLOGIC ABO: CPT | Performed by: NURSE PRACTITIONER

## 2019-01-01 PROCEDURE — 83605 ASSAY OF LACTIC ACID: CPT | Performed by: NURSE PRACTITIONER

## 2019-01-01 PROCEDURE — 82805 BLOOD GASES W/O2 SATURATION: CPT | Performed by: PHYSICIAN ASSISTANT

## 2019-01-01 RX ORDER — CHLORHEXIDINE GLUCONATE 0.12 MG/ML
15 RINSE ORAL EVERY 12 HOURS SCHEDULED
Status: DISCONTINUED | OUTPATIENT
Start: 2019-01-01 | End: 2019-01-01 | Stop reason: SDUPTHER

## 2019-01-01 RX ORDER — BUSPIRONE HYDROCHLORIDE 5 MG/1
7.5 TABLET ORAL EVERY 8 HOURS
Status: DISCONTINUED | OUTPATIENT
Start: 2019-01-01 | End: 2019-01-01

## 2019-01-01 RX ORDER — SODIUM CHLORIDE 450 MG/100ML
100 INJECTION, SOLUTION INTRAVENOUS CONTINUOUS
Status: DISCONTINUED | OUTPATIENT
Start: 2019-01-01 | End: 2019-07-30

## 2019-01-01 RX ORDER — DEXTROSE MONOHYDRATE 25 G/50ML
INJECTION, SOLUTION INTRAVENOUS
Status: COMPLETED
Start: 2019-01-01 | End: 2019-01-01

## 2019-01-01 RX ORDER — METHYLPREDNISOLONE SODIUM SUCCINATE 125 MG/2ML
125 INJECTION, POWDER, LYOPHILIZED, FOR SOLUTION INTRAMUSCULAR; INTRAVENOUS ONCE
Status: COMPLETED | OUTPATIENT
Start: 2019-01-01 | End: 2019-01-01

## 2019-01-01 RX ORDER — METHYLPREDNISOLONE SODIUM SUCCINATE 125 MG/2ML
60 INJECTION, POWDER, LYOPHILIZED, FOR SOLUTION INTRAMUSCULAR; INTRAVENOUS EVERY 6 HOURS SCHEDULED
Status: DISCONTINUED | OUTPATIENT
Start: 2019-01-01 | End: 2019-07-30

## 2019-01-01 RX ORDER — POTASSIUM CHLORIDE 14.9 MG/ML
20 INJECTION INTRAVENOUS ONCE
Status: COMPLETED | OUTPATIENT
Start: 2019-01-01 | End: 2019-01-01

## 2019-01-01 RX ORDER — LEVALBUTEROL 1.25 MG/.5ML
1.25 SOLUTION, CONCENTRATE RESPIRATORY (INHALATION)
Status: DISCONTINUED | OUTPATIENT
Start: 2019-01-01 | End: 2019-07-31 | Stop reason: HOSPADM

## 2019-01-01 RX ORDER — POTASSIUM CHLORIDE 29.8 MG/ML
40 INJECTION INTRAVENOUS ONCE
Status: DISCONTINUED | OUTPATIENT
Start: 2019-01-01 | End: 2019-01-01

## 2019-01-01 RX ORDER — MINERAL OIL AND PETROLATUM 150; 830 MG/G; MG/G
OINTMENT OPHTHALMIC
Status: DISCONTINUED | OUTPATIENT
Start: 2019-01-01 | End: 2019-01-01

## 2019-01-01 RX ORDER — MAGNESIUM SULFATE 1 G/100ML
1 INJECTION INTRAVENOUS ONCE
Status: COMPLETED | OUTPATIENT
Start: 2019-01-01 | End: 2019-01-01

## 2019-01-01 RX ORDER — PROPOFOL 10 MG/ML
5-50 INJECTION, EMULSION INTRAVENOUS
Status: DISCONTINUED | OUTPATIENT
Start: 2019-01-01 | End: 2019-01-01

## 2019-01-01 RX ORDER — SODIUM CHLORIDE, SODIUM LACTATE, POTASSIUM CHLORIDE, CALCIUM CHLORIDE 600; 310; 30; 20 MG/100ML; MG/100ML; MG/100ML; MG/100ML
1000 INJECTION, SOLUTION INTRAVENOUS CONTINUOUS
Status: DISCONTINUED | OUTPATIENT
Start: 2019-01-01 | End: 2019-01-01

## 2019-01-01 RX ORDER — FENTANYL CITRATE-0.9 % NACL/PF 10 MCG/ML
25 PLASTIC BAG, INJECTION (ML) INTRAVENOUS CONTINUOUS
Status: DISCONTINUED | OUTPATIENT
Start: 2019-01-01 | End: 2019-01-01

## 2019-01-01 RX ORDER — LABETALOL 20 MG/4 ML (5 MG/ML) INTRAVENOUS SYRINGE
10 EVERY 6 HOURS PRN
Status: DISCONTINUED | OUTPATIENT
Start: 2019-01-01 | End: 2019-07-31 | Stop reason: HOSPADM

## 2019-01-01 RX ORDER — ALBUTEROL SULFATE 2.5 MG/3ML
SOLUTION RESPIRATORY (INHALATION)
Status: COMPLETED
Start: 2019-01-01 | End: 2019-01-01

## 2019-01-01 RX ORDER — FENTANYL CITRATE 50 UG/ML
50 INJECTION, SOLUTION INTRAMUSCULAR; INTRAVENOUS EVERY 2 HOUR PRN
Status: DISCONTINUED | OUTPATIENT
Start: 2019-01-01 | End: 2019-01-01

## 2019-01-01 RX ORDER — POTASSIUM CHLORIDE 14.9 MG/ML
20 INJECTION INTRAVENOUS
Status: COMPLETED | OUTPATIENT
Start: 2019-01-01 | End: 2019-01-01

## 2019-01-01 RX ORDER — MAGNESIUM SULFATE HEPTAHYDRATE 40 MG/ML
2 INJECTION, SOLUTION INTRAVENOUS ONCE
Status: COMPLETED | OUTPATIENT
Start: 2019-01-01 | End: 2019-01-01

## 2019-01-01 RX ORDER — FAMOTIDINE 40 MG/5ML
20 POWDER, FOR SUSPENSION ORAL 2 TIMES DAILY
Status: DISCONTINUED | OUTPATIENT
Start: 2019-01-01 | End: 2019-01-01

## 2019-01-01 RX ORDER — POTASSIUM CHLORIDE 20MEQ/15ML
40 LIQUID (ML) ORAL ONCE
Status: COMPLETED | OUTPATIENT
Start: 2019-01-01 | End: 2019-01-01

## 2019-01-01 RX ORDER — BUSPIRONE HYDROCHLORIDE 10 MG/1
30 TABLET ORAL EVERY 8 HOURS
Status: DISCONTINUED | OUTPATIENT
Start: 2019-01-01 | End: 2019-01-01

## 2019-01-01 RX ORDER — ACETAMINOPHEN 325 MG/1
975 TABLET ORAL EVERY 6 HOURS
Status: DISCONTINUED | OUTPATIENT
Start: 2019-01-01 | End: 2019-01-01

## 2019-01-01 RX ORDER — HEPARIN SODIUM 5000 [USP'U]/ML
5000 INJECTION, SOLUTION INTRAVENOUS; SUBCUTANEOUS EVERY 8 HOURS SCHEDULED
Status: DISCONTINUED | OUTPATIENT
Start: 2019-01-01 | End: 2019-07-31 | Stop reason: HOSPADM

## 2019-01-01 RX ORDER — KETAMINE HYDROCHLORIDE 50 MG/ML
INJECTION, SOLUTION, CONCENTRATE INTRAMUSCULAR; INTRAVENOUS
Status: COMPLETED
Start: 2019-01-01 | End: 2019-01-01

## 2019-01-01 RX ORDER — VECURONIUM BROMIDE 1 MG/ML
5 INJECTION, POWDER, LYOPHILIZED, FOR SOLUTION INTRAVENOUS ONCE
Status: COMPLETED | OUTPATIENT
Start: 2019-01-01 | End: 2019-01-01

## 2019-01-01 RX ORDER — CHLORHEXIDINE GLUCONATE 0.12 MG/ML
15 RINSE ORAL EVERY 12 HOURS SCHEDULED
Status: DISCONTINUED | OUTPATIENT
Start: 2019-01-01 | End: 2019-07-31 | Stop reason: HOSPADM

## 2019-01-01 RX ORDER — METHYLPREDNISOLONE SODIUM SUCCINATE 40 MG/ML
40 INJECTION, POWDER, LYOPHILIZED, FOR SOLUTION INTRAMUSCULAR; INTRAVENOUS EVERY 8 HOURS SCHEDULED
Status: DISCONTINUED | OUTPATIENT
Start: 2019-01-01 | End: 2019-01-01

## 2019-01-01 RX ORDER — MEPERIDINE HYDROCHLORIDE 50 MG/ML
25 INJECTION INTRAMUSCULAR; INTRAVENOUS; SUBCUTANEOUS EVERY 4 HOURS PRN
Status: DISCONTINUED | OUTPATIENT
Start: 2019-01-01 | End: 2019-01-01

## 2019-01-01 RX ORDER — SODIUM BICARBONATE 84 MG/ML
INJECTION, SOLUTION INTRAVENOUS CODE/TRAUMA/SEDATION MEDICATION
Status: COMPLETED | OUTPATIENT
Start: 2019-01-01 | End: 2019-01-01

## 2019-01-01 RX ORDER — ACETAMINOPHEN 325 MG/1
975 TABLET ORAL EVERY 6 HOURS PRN
Status: DISCONTINUED | OUTPATIENT
Start: 2019-01-01 | End: 2019-07-31 | Stop reason: HOSPADM

## 2019-01-01 RX ORDER — HYDRALAZINE HYDROCHLORIDE 20 MG/ML
10 INJECTION INTRAMUSCULAR; INTRAVENOUS ONCE
Status: COMPLETED | OUTPATIENT
Start: 2019-01-01 | End: 2019-01-01

## 2019-01-01 RX ORDER — NOREPINEPHRINE BITARTRATE 1 MG/ML
INJECTION, SOLUTION INTRAVENOUS
Status: DISPENSED
Start: 2019-01-01 | End: 2019-01-01

## 2019-01-01 RX ORDER — KETAMINE HCL IN NACL, ISO-OSM 100MG/10ML
1 SYRINGE (ML) INJECTION ONCE
Status: COMPLETED | OUTPATIENT
Start: 2019-01-01 | End: 2019-01-01

## 2019-01-01 RX ORDER — METHYLPREDNISOLONE SODIUM SUCCINATE 125 MG/2ML
125 INJECTION, POWDER, LYOPHILIZED, FOR SOLUTION INTRAMUSCULAR; INTRAVENOUS EVERY 6 HOURS SCHEDULED
Status: DISCONTINUED | OUTPATIENT
Start: 2019-01-01 | End: 2019-01-01

## 2019-01-01 RX ORDER — IPRATROPIUM BROMIDE AND ALBUTEROL SULFATE 2.5; .5 MG/3ML; MG/3ML
SOLUTION RESPIRATORY (INHALATION)
Status: COMPLETED
Start: 2019-01-01 | End: 2019-01-01

## 2019-01-01 RX ORDER — FAMOTIDINE 40 MG/5ML
20 POWDER, FOR SUSPENSION ORAL DAILY
Status: DISCONTINUED | OUTPATIENT
Start: 2019-07-30 | End: 2019-07-31 | Stop reason: HOSPADM

## 2019-01-01 RX ORDER — KETAMINE HCL IN NACL, ISO-OSM 100MG/10ML
1 SYRINGE (ML) INJECTION ONCE
Status: DISCONTINUED | OUTPATIENT
Start: 2019-01-01 | End: 2019-01-01

## 2019-01-01 RX ORDER — METHYLPREDNISOLONE SODIUM SUCCINATE 40 MG/ML
40 INJECTION, POWDER, LYOPHILIZED, FOR SOLUTION INTRAMUSCULAR; INTRAVENOUS EVERY 12 HOURS SCHEDULED
Status: DISCONTINUED | OUTPATIENT
Start: 2019-01-01 | End: 2019-01-01

## 2019-01-01 RX ADMIN — SODIUM BICARBONATE 150 ML/HR: 84 INJECTION, SOLUTION INTRAVENOUS at 03:38

## 2019-01-01 RX ADMIN — Medication 100 MG: at 23:37

## 2019-01-01 RX ADMIN — HEPARIN SODIUM 5000 UNITS: 5000 INJECTION INTRAVENOUS; SUBCUTANEOUS at 22:08

## 2019-01-01 RX ADMIN — CHLORHEXIDINE GLUCONATE 0.12% ORAL RINSE 15 ML: 1.2 LIQUID ORAL at 02:25

## 2019-01-01 RX ADMIN — MEPERIDINE HYDROCHLORIDE 25 MG: 50 INJECTION INTRAMUSCULAR; INTRAVENOUS; SUBCUTANEOUS at 00:15

## 2019-01-01 RX ADMIN — NOREPINEPHRINE BITARTRATE 15 MCG/MIN: 1 INJECTION INTRAVENOUS at 13:40

## 2019-01-01 RX ADMIN — METHYLPREDNISOLONE SODIUM SUCCINATE 40 MG: 40 INJECTION, POWDER, FOR SOLUTION INTRAMUSCULAR; INTRAVENOUS at 08:16

## 2019-01-01 RX ADMIN — LEVALBUTEROL 1.25 MG: 1.25 SOLUTION, CONCENTRATE RESPIRATORY (INHALATION) at 13:51

## 2019-01-01 RX ADMIN — SODIUM CHLORIDE, POTASSIUM CHLORIDE, SODIUM LACTATE AND CALCIUM CHLORIDE 550 ML: 600; 310; 30; 20 INJECTION, SOLUTION INTRAVENOUS at 20:01

## 2019-01-01 RX ADMIN — SODIUM BICARBONATE 150 ML/HR: 84 INJECTION, SOLUTION INTRAVENOUS at 19:06

## 2019-01-01 RX ADMIN — SODIUM BICARBONATE 150 ML/HR: 84 INJECTION, SOLUTION INTRAVENOUS at 12:30

## 2019-01-01 RX ADMIN — METHYLPREDNISOLONE SODIUM SUCCINATE 60 MG: 125 INJECTION, POWDER, FOR SOLUTION INTRAMUSCULAR; INTRAVENOUS at 11:16

## 2019-01-01 RX ADMIN — SODIUM BICARBONATE 50 MEQ: 84 INJECTION, SOLUTION INTRAVENOUS at 23:05

## 2019-01-01 RX ADMIN — IPRATROPIUM BROMIDE AND ALBUTEROL SULFATE: 2.5; .5 SOLUTION RESPIRATORY (INHALATION) at 23:55

## 2019-01-01 RX ADMIN — POTASSIUM CHLORIDE 20 MEQ: 200 INJECTION, SOLUTION INTRAVENOUS at 13:36

## 2019-01-01 RX ADMIN — SODIUM CHLORIDE, POTASSIUM CHLORIDE, SODIUM LACTATE AND CALCIUM CHLORIDE 150 ML: 600; 310; 30; 20 INJECTION, SOLUTION INTRAVENOUS at 22:04

## 2019-01-01 RX ADMIN — NOREPINEPHRINE BITARTRATE 25 MCG/MIN: 1 INJECTION INTRAVENOUS at 04:12

## 2019-01-01 RX ADMIN — FAMOTIDINE 20 MG: 40 POWDER, FOR SUSPENSION ORAL at 08:17

## 2019-01-01 RX ADMIN — SODIUM CHLORIDE 1000 ML: 0.9 INJECTION, SOLUTION INTRAVENOUS at 23:52

## 2019-01-01 RX ADMIN — NOREPINEPHRINE BITARTRATE 25 MCG/MIN: 1 INJECTION INTRAVENOUS at 11:17

## 2019-01-01 RX ADMIN — PROPOFOL 25 MCG/KG/MIN: 10 INJECTION, EMULSION INTRAVENOUS at 00:06

## 2019-01-01 RX ADMIN — NOREPINEPHRINE BITARTRATE 20 MCG/MIN: 1 INJECTION INTRAVENOUS at 01:40

## 2019-01-01 RX ADMIN — CHLORHEXIDINE GLUCONATE 0.12% ORAL RINSE 15 ML: 1.2 LIQUID ORAL at 20:43

## 2019-01-01 RX ADMIN — SODIUM CHLORIDE, POTASSIUM CHLORIDE, SODIUM LACTATE AND CALCIUM CHLORIDE 1000 ML: 600; 310; 30; 20 INJECTION, SOLUTION INTRAVENOUS at 10:11

## 2019-01-01 RX ADMIN — ALBUTEROL SULFATE: 2.5 SOLUTION RESPIRATORY (INHALATION) at 23:55

## 2019-01-01 RX ADMIN — EPINEPHRINE 1 MG: 0.1 INJECTION, SOLUTION ENDOTRACHEAL; INTRACARDIAC; INTRAVENOUS at 22:44

## 2019-01-01 RX ADMIN — METHYLPREDNISOLONE SODIUM SUCCINATE 125 MG: 125 INJECTION, POWDER, FOR SOLUTION INTRAMUSCULAR; INTRAVENOUS at 00:13

## 2019-01-01 RX ADMIN — MAGNESIUM SULFATE HEPTAHYDRATE 1 G: 1 INJECTION, SOLUTION INTRAVENOUS at 01:43

## 2019-01-01 RX ADMIN — PROPOFOL 5 MCG/KG/MIN: 10 INJECTION, EMULSION INTRAVENOUS at 01:43

## 2019-01-01 RX ADMIN — VASOPRESSIN 0.03 UNITS/MIN: 20 INJECTION INTRAVENOUS at 21:01

## 2019-01-01 RX ADMIN — SODIUM BICARBONATE: 84 INJECTION, SOLUTION INTRAVENOUS at 23:20

## 2019-01-01 RX ADMIN — HYDRALAZINE HYDROCHLORIDE 10 MG: 20 INJECTION INTRAMUSCULAR; INTRAVENOUS at 01:25

## 2019-01-01 RX ADMIN — POTASSIUM CHLORIDE 40 MEQ: 20 SOLUTION ORAL at 04:09

## 2019-01-01 RX ADMIN — SODIUM CHLORIDE 0.5 MCG/KG/MIN: 0.9 INJECTION, SOLUTION INTRAVENOUS at 11:35

## 2019-01-01 RX ADMIN — ACETAMINOPHEN 975 MG: 325 TABLET ORAL at 02:14

## 2019-01-01 RX ADMIN — BUSPIRONE HYDROCHLORIDE 7.5 MG: 5 TABLET ORAL at 17:49

## 2019-01-01 RX ADMIN — EPINEPHRINE 1 MG: 0.1 INJECTION, SOLUTION ENDOTRACHEAL; INTRACARDIAC; INTRAVENOUS at 23:04

## 2019-01-01 RX ADMIN — LEVALBUTEROL 1.25 MG: 1.25 SOLUTION, CONCENTRATE RESPIRATORY (INHALATION) at 07:24

## 2019-01-01 RX ADMIN — CHLORHEXIDINE GLUCONATE 0.12% ORAL RINSE 15 ML: 1.2 LIQUID ORAL at 08:08

## 2019-01-01 RX ADMIN — METHYLPREDNISOLONE SODIUM SUCCINATE 125 MG: 125 INJECTION, POWDER, FOR SOLUTION INTRAMUSCULAR; INTRAVENOUS at 00:06

## 2019-01-01 RX ADMIN — LEVALBUTEROL 1.25 MG: 1.25 SOLUTION, CONCENTRATE RESPIRATORY (INHALATION) at 00:06

## 2019-01-01 RX ADMIN — LABETALOL 20 MG/4 ML (5 MG/ML) INTRAVENOUS SYRINGE 10 MG: at 20:00

## 2019-01-01 RX ADMIN — HEPARIN SODIUM 5000 UNITS: 5000 INJECTION INTRAVENOUS; SUBCUTANEOUS at 21:07

## 2019-01-01 RX ADMIN — ACETAMINOPHEN 975 MG: 325 TABLET ORAL at 20:09

## 2019-01-01 RX ADMIN — SODIUM CHLORIDE, POTASSIUM CHLORIDE, SODIUM LACTATE AND CALCIUM CHLORIDE 575 ML: 600; 310; 30; 20 INJECTION, SOLUTION INTRAVENOUS at 18:07

## 2019-01-01 RX ADMIN — NOREPINEPHRINE BITARTRATE 10 MCG/MIN: 1 INJECTION INTRAVENOUS at 20:29

## 2019-01-01 RX ADMIN — LEVALBUTEROL 1.25 MG: 1.25 SOLUTION, CONCENTRATE RESPIRATORY (INHALATION) at 19:05

## 2019-01-01 RX ADMIN — Medication 50 MCG/HR: at 22:08

## 2019-01-01 RX ADMIN — CHLORHEXIDINE GLUCONATE 0.12% ORAL RINSE 15 ML: 1.2 LIQUID ORAL at 08:15

## 2019-01-01 RX ADMIN — METHYLPREDNISOLONE SODIUM SUCCINATE 125 MG: 125 INJECTION, POWDER, FOR SOLUTION INTRAMUSCULAR; INTRAVENOUS at 05:07

## 2019-01-01 RX ADMIN — LEVALBUTEROL 1.25 MG: 1.25 SOLUTION, CONCENTRATE RESPIRATORY (INHALATION) at 13:07

## 2019-01-01 RX ADMIN — Medication 100 MG: at 00:01

## 2019-01-01 RX ADMIN — ACETAMINOPHEN 975 MG: 325 TABLET ORAL at 13:35

## 2019-01-01 RX ADMIN — SODIUM BICARBONATE 150 ML/HR: 84 INJECTION, SOLUTION INTRAVENOUS at 11:34

## 2019-01-01 RX ADMIN — METHYLPREDNISOLONE SODIUM SUCCINATE 60 MG: 125 INJECTION, POWDER, FOR SOLUTION INTRAMUSCULAR; INTRAVENOUS at 17:17

## 2019-01-01 RX ADMIN — LEVALBUTEROL 1.25 MG: 1.25 SOLUTION, CONCENTRATE RESPIRATORY (INHALATION) at 19:10

## 2019-01-01 RX ADMIN — CALCIUM GLUCONATE 2 G: 98 INJECTION, SOLUTION INTRAVENOUS at 18:24

## 2019-01-01 RX ADMIN — MAGNESIUM SULFATE HEPTAHYDRATE 2 G: 40 INJECTION, SOLUTION INTRAVENOUS at 00:18

## 2019-01-01 RX ADMIN — FAMOTIDINE 20 MG: 40 POWDER, FOR SUSPENSION ORAL at 17:49

## 2019-01-01 RX ADMIN — HEPARIN SODIUM 5000 UNITS: 5000 INJECTION INTRAVENOUS; SUBCUTANEOUS at 13:45

## 2019-01-01 RX ADMIN — VECURONIUM BROMIDE 5 MG: 1 INJECTION, POWDER, LYOPHILIZED, FOR SOLUTION INTRAVENOUS at 00:22

## 2019-01-01 RX ADMIN — IPRATROPIUM BROMIDE 0.5 MG: 0.5 SOLUTION RESPIRATORY (INHALATION) at 19:10

## 2019-01-01 RX ADMIN — HEPARIN SODIUM 5000 UNITS: 5000 INJECTION INTRAVENOUS; SUBCUTANEOUS at 05:07

## 2019-01-01 RX ADMIN — METHYLPREDNISOLONE SODIUM SUCCINATE 125 MG: 125 INJECTION, POWDER, FOR SOLUTION INTRAMUSCULAR; INTRAVENOUS at 13:35

## 2019-01-01 RX ADMIN — LEVALBUTEROL 1.25 MG: 1.25 SOLUTION, CONCENTRATE RESPIRATORY (INHALATION) at 07:16

## 2019-01-01 RX ADMIN — CHLORHEXIDINE GLUCONATE 0.12% ORAL RINSE 15 ML: 1.2 LIQUID ORAL at 20:09

## 2019-01-01 RX ADMIN — Medication 25 MCG/HR: at 01:48

## 2019-01-01 RX ADMIN — EPINEPHRINE 1 MG: 0.1 INJECTION, SOLUTION ENDOTRACHEAL; INTRACARDIAC; INTRAVENOUS at 22:41

## 2019-01-01 RX ADMIN — IPRATROPIUM BROMIDE 0.5 MG: 0.5 SOLUTION RESPIRATORY (INHALATION) at 13:07

## 2019-01-01 RX ADMIN — WHITE PETROLATUM 57.7 %-MINERAL OIL 31.9 % EYE OINTMENT: at 15:48

## 2019-01-01 RX ADMIN — IPRATROPIUM BROMIDE 0.5 MG: 0.5 SOLUTION RESPIRATORY (INHALATION) at 13:51

## 2019-01-01 RX ADMIN — CALCIUM GLUCONATE 2 G: 98 INJECTION, SOLUTION INTRAVENOUS at 13:00

## 2019-01-01 RX ADMIN — DEXTROSE MONOHYDRATE 50 ML: 25 INJECTION, SOLUTION INTRAVENOUS at 22:08

## 2019-01-01 RX ADMIN — HEPARIN SODIUM 5000 UNITS: 5000 INJECTION INTRAVENOUS; SUBCUTANEOUS at 02:24

## 2019-01-01 RX ADMIN — IPRATROPIUM BROMIDE 0.5 MG: 0.5 SOLUTION RESPIRATORY (INHALATION) at 07:24

## 2019-01-01 RX ADMIN — IPRATROPIUM BROMIDE 0.5 MG: 0.5 SOLUTION RESPIRATORY (INHALATION) at 00:05

## 2019-01-01 RX ADMIN — SODIUM BICARBONATE 50 MEQ: 84 INJECTION, SOLUTION INTRAVENOUS at 23:07

## 2019-01-01 RX ADMIN — METHYLPREDNISOLONE SODIUM SUCCINATE 125 MG: 125 INJECTION, POWDER, FOR SOLUTION INTRAMUSCULAR; INTRAVENOUS at 17:51

## 2019-01-01 RX ADMIN — ACETAMINOPHEN 975 MG: 325 TABLET ORAL at 08:15

## 2019-01-01 RX ADMIN — NOREPINEPHRINE BITARTRATE 26 MCG/MIN: 1 INJECTION INTRAVENOUS at 06:47

## 2019-01-01 RX ADMIN — SODIUM CHLORIDE 5 MG/HR: 0.9 INJECTION, SOLUTION INTRAVENOUS at 02:40

## 2019-01-01 RX ADMIN — SODIUM CHLORIDE 10 UNITS/HR: 9 INJECTION, SOLUTION INTRAVENOUS at 04:22

## 2019-01-01 RX ADMIN — FAMOTIDINE 20 MG: 40 POWDER, FOR SUSPENSION ORAL at 08:22

## 2019-01-01 RX ADMIN — NOREPINEPHRINE BITARTRATE 26 MCG/MIN: 1 INJECTION INTRAVENOUS at 08:40

## 2019-01-01 RX ADMIN — WHITE PETROLATUM 57.7 %-MINERAL OIL 31.9 % EYE OINTMENT: at 17:34

## 2019-01-01 RX ADMIN — KETAMINE HYDROCHLORIDE: 50 INJECTION INTRAMUSCULAR; INTRAVENOUS at 23:55

## 2019-01-01 RX ADMIN — EPINEPHRINE 1 MCG/MIN: 1 INJECTION, SOLUTION, CONCENTRATE INTRAVENOUS at 23:17

## 2019-01-01 RX ADMIN — WHITE PETROLATUM 57.7 %-MINERAL OIL 31.9 % EYE OINTMENT: at 13:22

## 2019-01-01 RX ADMIN — SODIUM CHLORIDE 4 UNITS/HR: 9 INJECTION, SOLUTION INTRAVENOUS at 16:48

## 2019-01-01 RX ADMIN — POTASSIUM CHLORIDE 20 MEQ: 200 INJECTION, SOLUTION INTRAVENOUS at 06:15

## 2019-01-01 RX ADMIN — HEPARIN SODIUM 5000 UNITS: 5000 INJECTION INTRAVENOUS; SUBCUTANEOUS at 15:11

## 2019-01-01 RX ADMIN — BUSPIRONE HYDROCHLORIDE 30 MG: 10 TABLET ORAL at 01:52

## 2019-01-01 RX ADMIN — SODIUM CHLORIDE, POTASSIUM CHLORIDE, SODIUM LACTATE AND CALCIUM CHLORIDE 650 ML: 600; 310; 30; 20 INJECTION, SOLUTION INTRAVENOUS at 13:16

## 2019-01-01 RX ADMIN — POTASSIUM PHOSPHATE, MONOBASIC AND POTASSIUM PHOSPHATE, DIBASIC 31.5 MMOL: 224; 236 INJECTION, SOLUTION INTRAVENOUS at 14:17

## 2019-01-01 RX ADMIN — POTASSIUM CHLORIDE 20 MEQ: 200 INJECTION, SOLUTION INTRAVENOUS at 04:10

## 2019-01-01 RX ADMIN — POTASSIUM CHLORIDE 20 MEQ: 200 INJECTION, SOLUTION INTRAVENOUS at 15:46

## 2019-01-01 RX ADMIN — BUSPIRONE HYDROCHLORIDE 7.5 MG: 5 TABLET ORAL at 10:05

## 2019-01-01 RX ADMIN — IPRATROPIUM BROMIDE 0.5 MG: 0.5 SOLUTION RESPIRATORY (INHALATION) at 07:16

## 2019-01-01 RX ADMIN — IPRATROPIUM BROMIDE 0.5 MG: 0.5 SOLUTION RESPIRATORY (INHALATION) at 19:05

## 2019-01-01 RX ADMIN — SODIUM BICARBONATE 50 MEQ: 84 INJECTION, SOLUTION INTRAVENOUS at 23:20

## 2019-01-01 RX ADMIN — BUSPIRONE HYDROCHLORIDE 7.5 MG: 5 TABLET ORAL at 08:21

## 2019-07-28 PROBLEM — G93.1 ANOXIC ENCEPHALOPATHY (HCC): Status: ACTIVE | Noted: 2019-01-01

## 2019-07-28 PROBLEM — J45.40 MODERATE PERSISTENT ASTHMA: Status: ACTIVE | Noted: 2018-06-25

## 2019-07-28 PROBLEM — I46.9 CARDIAC ARREST (HCC): Status: ACTIVE | Noted: 2019-01-01

## 2019-07-28 PROBLEM — R01.1 SYSTOLIC MURMUR: Status: ACTIVE | Noted: 2018-01-01

## 2019-07-28 PROBLEM — J45.901 ASTHMA EXACERBATION: Status: ACTIVE | Noted: 2019-01-01

## 2019-07-28 PROBLEM — J42 CHRONIC BRONCHITIS (HCC): Status: ACTIVE | Noted: 2019-01-01

## 2019-07-28 PROBLEM — E87.2 METABOLIC ACIDOSIS: Status: ACTIVE | Noted: 2019-01-01

## 2019-07-28 PROBLEM — J96.02 ACUTE RESPIRATORY FAILURE WITH HYPOXIA AND HYPERCAPNIA (HCC): Status: ACTIVE | Noted: 2019-01-01

## 2019-07-28 PROBLEM — J96.01 ACUTE RESPIRATORY FAILURE WITH HYPOXIA AND HYPERCAPNIA (HCC): Status: ACTIVE | Noted: 2019-01-01

## 2019-07-28 PROBLEM — N17.9 ACUTE KIDNEY INJURY (HCC): Status: ACTIVE | Noted: 2019-01-01

## 2019-07-28 PROBLEM — J44.9 COPD (CHRONIC OBSTRUCTIVE PULMONARY DISEASE) (HCC): Status: ACTIVE | Noted: 2019-01-01

## 2019-07-28 PROBLEM — F17.200 SMOKER: Status: ACTIVE | Noted: 2018-06-25

## 2019-07-28 NOTE — H&P
History & Physical Exam - [address] [place] 46 y o  female MRN: [id number]  Unit/Bed#: [facility] Encounter: [id number]      Assessment/Plan:  1  Cardiac arrest likely secondary to respiratory arrest  · Patient will be admitted to hospital on inpatient status with anticipated LOS greater than 2 midnights  · ROSC after 3 episodes of arrest  · Initially hypotensive on pressors, now hypertensive  · Will start cardene gtt and give bolus of hydralazine  · Start TTM with goal temperature of 34 degrees celsius  · Will follow get AM troponin  · Will continue to manage respiratory symptoms  · Will get in touch will family to discuss goals of care  · Consider ECHO to look for RV dilation if possible PE source of arrest  2  Severe asthma/COPD exacerbation leading to respiratory arrest  · Patient received multiple episodes of albuterol/ipotropium in ED  · Also received MgS 2g, ketamine, and vecuronium for persistent poor compliance of lungs and elevated peak pressures  · Will reassess pressures after vec wears off and if pressure go back up will consider nimbex  · Continue xopenex and atrovent Q6  · Continue solumedrol 40mg BID  · MV ACVC 28 400 50 5  · Wean FiO2 and keep minute ventilation goal 11-12  · Frequent ventilator reassessment and mangagement  3  Anoxic encephalopathy  · Secondary to cardiac arrest  · Starting TTM goal 34  · No core neuro reflexes currently, positive dolls eyes  · Will cool for 24 hours then start rewarming process  · Start low dose propofol and fentanyl while intubated and on TTM  · Will start TTM adjunct medications   4  Acute metabolic acidosis 2/2 lactic acidosis  · Will continue trending lactic acid   · Will continue fluid resuscitation and trend lactic acid  · On bicarb gtt currently at 150cc/hr  5   Hyperkalemia  · Will closely monitor as patient is on TTM with frequent labs  · Will recheck BMP now and treat if K continues to rise      Critical Care Time: 45  Documented critical care time excludes any procedures documented elsewhere  It also excludes any family updates    _____________________________________________________________________      HPI:    Campbell Barron is a 46 y o  female With past medical history of asthma prevents for admission to the ICU after cardiac arrest   Patient reportedly called 911 complaining of shortness of breath and went unresponsive mid phone call  Upon EMS arrival, responders had to kicked down the patient's front door to get into her apartment and she was found down, unresponsive, pulseless  There is approximately 15-25 minutes in between phone call and EMS arrival   CPR was initiated and patient was intubated  Patient had raw skin the field but then arrested again upon arrival to the emergency department  Cross was obtained in the emergency department and wants more arrested  Patient was defibrillated had after 2-3 doses more of epinephrine and other ACLS adjuncts, ROSC was obtained  Patient was hypotensive and was started on epinephrine drip and was noted to be severely acidotic and was started on bicarb drip  Patient underwent multiple albuterol/ipratropium nebulizers for severe bronchospasm in tight airways  Patient was noted to have poor lung compliance with elevated peak pressures in the 50-60's  Respiratory therapy attempted to maximize ventilator settings crawl to make compliance however patient is persistently having elevated peak pressures  Oxygenation was not an issue  Patient also received in the emergency department magnesium, Solu-Medrol, ketamine and a dose of vecuronium by critical care  It appears that ketamine and vecuronium help the most with peak pressures however they remain to be the mid 40s to 50s  Patient has 1 emergency contact however unable to get in touch with him secondary to phone going straight to voicemail      Review of Systems:  Review of Systems   Unable to perform ROS: Intubated     Full 12 point review of systems was performed  Aside from what was mentioned in the HPI, it is otherwise negative  Historical Information   No past medical history on file  No past surgical history on file  Social History   Social History     Substance and Sexual Activity   Alcohol Use Not on file     Social History     Substance and Sexual Activity   Drug Use Not on file     Social History     Tobacco Use   Smoking Status Not on file       Family History:   No family history on file  Medications:  Pertinent medications were reviewed    Current Facility-Administered Medications:  epinephrine 1-10 mcg/min Intravenous Titrated Azam Lombardi MD Last Rate: 2 mcg/min (07/28/19 0015)   ketamine 0 5 mg/kg/hr Intravenous Continuous Crystal L Yariel, DO    magnesium sulfate 2 g Intravenous Once Crystal L Yariel, DO    norepinephrine        norepinephrine 1-30 mcg/min Intravenous Titrated Azam Lombardi MD          Not on File      Vitals:   BP (!) 172/98 (BP Location: Right arm)   Pulse (!) 134   Temp (!) 97 2 °F (36 2 °C) (Probe)   Resp (!) 27   Wt 70 6 kg (155 lb 10 3 oz)   SpO2 98%   There is no height or weight on file to calculate BMI    SpO2: 98 %,   SpO2 Activity: At Rest,   O2 Device: (Vent)      Intake/Output Summary (Last 24 hours) at 7/28/2019 0055  Last data filed at 7/27/2019 2200  Gross per 24 hour   Intake 100 ml   Output    Net 100 ml     Invasive Devices     Peripheral Intravenous Line            Peripheral IV 07/27/19 Left Antecubital less than 1 day    Peripheral IV 07/27/19 Right Antecubital less than 1 day    Peripheral IV 07/27/19 Right Hand less than 1 day    Peripheral IV 07/28/19 Left Hand less than 1 day          Drain            NG/OG/Enteral Tube Orogastric Center mouth less than 1 day    Urethral Catheter Temperature probe 16 Fr  less than 1 day                Physical Exam:  Gen: resting on sedation, nonresponsive  HE:  ET/OT in place, neck supple  ENT:Nares patent, MMM  Neck/lymph: supple, no JVD, trachea midline  Chest: Diffuse rhonchi and restrictive airway sounds  Cor: Sinus tachycardia, no m/g/r  Abd: Slightly distending, soft  Ext: No edema  Neuro: GCS 3T, pupils fixed and dilated, + dolls eyes, no cough, gag, or cornea, no spontaneous movements  Skin: no rashes      Diagnostic Data:  Lab: I have personally reviewed pertinent lab results  ,   CBC:  Results from last 7 days   Lab Units 07/27/19  2309   WBC Thousand/uL 12 41*   HEMOGLOBIN g/dL 11 4*   HEMATOCRIT % 42 9   PLATELETS Thousands/uL 260      CMP: Lab Results   Component Value Date    SODIUM 140 07/27/2019    K 5 8 (H) 07/27/2019     07/27/2019    CO2 14 (L) 07/27/2019    CO2 13 (L) 07/27/2019    BUN 20 07/27/2019    CREATININE 1 53 (H) 07/27/2019    CALCIUM 9 0 07/27/2019    AST 31 07/27/2019    ALT 25 07/27/2019    ALKPHOS 47 07/27/2019    EGFR 38 07/27/2019   ,   PT/INR:   Lab Results   Component Value Date    INR 1 29 (H) 07/27/2019   ,   Troponin:   Lab Results   Component Value Date    TROPONINI <0 02 07/27/2019   ,   Magnesium: No components found for: MAG,  Phosphorous: No results found for: PHOS    ABG: Lab Results   Component Value Date    PHART 6 847 (LL) 07/27/2019    EXL9OYK 62 3 (HH) 07/27/2019    PO2ART 229 9 (H) 07/27/2019    JJH5HQX 10 6 (L) 07/27/2019    BEART -23 2 07/27/2019    SOURCE Radial, Left 07/27/2019   ,     Microbiology:      Imaging: I have personally reviewed the pertinent imaging studies on the PACS system  CXR: Diffuse white out right right lung - congestion vs early ARDs  Left lung with diminished volumes    EKG/telemetry/Echo:         VTE Prophylaxis: Heparin    Code Status: No Order    Portions of the record may have been created with voice recognition software  Occasional wrong word or "sound a like" substitutions may have occurred due to the inherent limitations of voice recognition software  Read the chart carefully and recognize, using context, where substitutions have occurred

## 2019-07-28 NOTE — ED PROVIDER NOTES
History  Chief Complaint   Patient presents with    Cardiac Arrest     46 y o  F p/w cardiac arrest   Pt called 911 c/o SOB and went unresponsive while on the phone with dispatch at 2157  EMS had to break into her house to gain entry at 2216  EMS noted pt had several inhalers around her house  Pt was unresponsive and in asystole upon EMS arrival   Pt was intubated with a 7 0 tube without sedation/paralytics and given 3 rounds of epi  IO was placed in her LLE by EMS  EMS obtained ROSC at 2032 and lost pulses upon ED arrival   ACLS protocol was resumed upon ED room arrival at 2241  Pt was given 2 rounds of epi and an amp of bicarb  ROSC was obtain at approx 2247 with a rhythm in sinus tachycardia  Pt had a GCS of 3T with nonreactive pupils  Pt's rhythm then deteriorated at 2301 to pulseless VT and CPR was restarted while charging for defibrillation and [facility]  ACLS was resumed  Pt was given an epi and an amp of bicarb  At next pulse check, pt was in PEA  ACLS was resumed again and given another 1mg of epi  ROSC was obtained at 2306  Pt was subsequently placed on an epi and bicarb drip  Ice packs were placed to bilateral groins/underarms for hypothermia protocol  Pt had plateau pressures in the 60s, so pt given ketamine and magnesium/solumedrol was ordered  I attempted to call emergency contact however it went straight to voicemail  There is no family present  Per [facility] records, pt has a h/o asthma  It is noted pt has been to [facility] for asthma exacerbations in the past   Pt admitted to the ICU  None       No past medical history on file  No past surgical history on file  No family history on file  I have reviewed and agree with the history as documented      Social History     Tobacco Use    Smoking status: Not on file   Substance Use Topics    Alcohol use: Not on file    Drug use: Not on file        Review of Systems   Unable to perform ROS: Patient unresponsive       Physical Exam  ED Triage Vitals   Temperature Pulse Respirations Blood Pressure SpO2   07/27/19 2348 07/27/19 2248 07/27/19 2248 07/27/19 2248 07/27/19 2248   (!) 93 °F (33 9 °C) (!) 125 (!) 30 (!) 196/94 100 %      Temp Source Heart Rate Source Patient Position - Orthostatic VS BP Location FiO2 (%)   07/27/19 2348 07/27/19 2253 07/27/19 2253 07/27/19 2253 --   Bladder Monitor Lying Right arm       Pain Score       07/27/19 2353       No Pain             Orthostatic Vital Signs  Vitals:    07/27/19 2329 07/27/19 2353 07/27/19 2359 07/28/19 0032   BP: (!) 64/44 139/92 151/99 (!) 172/98   Pulse: (!) 123 (!) 136 (!) 139 (!) 134   Patient Position - Orthostatic VS: Lying Lying Lying Sitting       Physical Exam   Constitutional:   Middle-aged appearing woman, unresponsive on arrival   HENT:   Head: Normocephalic and atraumatic  ET tube in place, 24 cm at the lip  Small amount of bloody aspirate from OG tube  Eyes:   Pupils fixed and dilated  No dolls eyes response   Neck: No JVD present  No tracheal deviation present  Cardiovascular:   Pulseless   Pulmonary/Chest:   No spontaneous respiratory effort, patient is intubated and has wheezes with bagging  Bilateral breath sounds are present  Abdominal: She exhibits distension  She exhibits no mass  Musculoskeletal: She exhibits no edema or deformity  Neurological:   Unresponsive, no spontaneous respirations, no gag reflex, fixed and dilated pupils  Skin: Skin is warm and dry  No livedo appreciated   Nursing note and vitals reviewed        ED Medications  Medications   EPINEPHrine 3000 mcg (STANDARD CONCENTRATION) IV in sodium chloride 0 9% 250 mL (2 mcg/min Intravenous Rate/Dose Change 7/28/19 0015)   norepinephrine (LEVOPHED) 1 mg/mL injection **ADS Override Pull** (  Not Given 7/28/19 0002)   ketamine 250 mg (STANDARD CONCENTRATION) IV in sodium chloride 0 9% 250 mL (0 5 mg/kg/hr × 70 6 kg Intravenous Not Given 7/27/19 2355)   magnesium sulfate 2 g/50 mL IVPB (premix) 2 g (2 g Intravenous New Bag 7/28/19 0018)   sodium chloride 0 9 % bolus 1,000 mL (1,000 mL Intravenous New Bag 7/27/19 2352)   sodium chloride 0 9 % bolus 1,000 mL (1,000 mL Intravenous New Bag 7/27/19 2352)   norepinephrine (LEVOPHED) 4 mg (STANDARD CONCENTRATION) IV in sodium chloride 0 9% 250 mL (6 mcg/min Intravenous Canceled Entry 7/27/19 2334)   albuterol (2 5 mg/3 mL) 0 083 % inhalation solution **ADS Override Pull** (  Given 7/27/19 2355)   EPINEPHrine (ADRENALIN) injection (1 mg Intravenous Given 7/27/19 2244)   EPINEPHrine (ADRENALIN) injection (1 mg Intravenous Given 7/27/19 2304)   sodium bicarbonate 50 mEq/50 mL injection (  Given 7/27/19 2320)   ketamine (KETALAR) 50 mg/mL **ADS Override Pull** (  Given by Other 7/27/19 2355)   ipratropium-albuterol (DUO-NEB) 0 5-2 5 mg/3 mL inhalation solution **ADS Override Pull** (  Given 7/27/19 2355)   Ketamine HCl 71 mg (100 mg Intravenous Given 7/27/19 2337)   methylPREDNISolone sodium succinate (Solu-MEDROL) injection 125 mg (125 mg Intravenous Given 7/28/19 0013)   sodium bicarbonate 50 mEq/50 mL injection (50 mEq Intravenous Given 7/27/19 2320)   vecuronium (NORCURON) injection 5 mg (5 mg Intravenous Given 7/28/19 0022)       Diagnostic Studies  Results Reviewed     Procedure Component Value Units Date/Time    Blood culture #2 [[id number]] Collected:  07/28/19 0048    Lab Status:  No result Specimen:  Blood from Arm, Left     B-type natriuretic peptide [[id number]]  (Abnormal) Collected:  07/27/19 2309    Lab Status:  Final result Specimen:  Blood from Arm, Left Updated:  07/27/19 2342     NT-proBNP 183 pg/mL     Lactic acid, plasma [[id number]]  (Abnormal) Collected:  07/27/19 2313    Lab Status:  Final result Specimen:  Blood from Arm, Right Updated:  07/27/19 2342     LACTIC ACID 14 4 mmol/L     Narrative:       Result may be elevated if tourniquet was used during collection      Magnesium [[id number]]  (Abnormal) Collected: 07/27/19 2309    Lab Status:  Final result Specimen:  Blood from Arm, Left Updated:  07/27/19 2342     Magnesium 2 7 mg/dL     Comprehensive metabolic panel [[id number]]  (Abnormal) Collected:  07/27/19 2309    Lab Status:  Final result Specimen:  Blood from Arm, Left Updated:  07/27/19 2333     Sodium 140 mmol/L      Potassium 5 8 mmol/L      Chloride 105 mmol/L      CO2 14 mmol/L      ANION GAP 21 mmol/L      BUN 20 mg/dL      Creatinine 1 53 mg/dL      Glucose 356 mg/dL      Calcium 9 0 mg/dL      AST 31 U/L      ALT 25 U/L      Alkaline Phosphatase 47 U/L      Total Protein 5 1 g/dL      Albumin 2 5 g/dL      Total Bilirubin 0 10 mg/dL      eGFR 38 ml/min/1 73sq m     Narrative:       [place] guidelines for Chronic Kidney Disease (CKD):     Stage 1 with normal or high GFR (GFR > 90 mL/min/1 73 square meters)    Stage 2 Mild CKD (GFR = 60-89 mL/min/1 73 square meters)    Stage 3A Moderate CKD (GFR = 45-59 mL/min/1 73 square meters)    Stage 3B Moderate CKD (GFR = 30-44 mL/min/1 73 square meters)    Stage 4 Severe CKD (GFR = 15-29 mL/min/1 73 square meters)    Stage 5 End Stage CKD (GFR <15 mL/min/1 73 square meters)  Note: GFR calculation is accurate only with a steady state creatinine    Troponin I [[id number]]  (Normal) Collected:  07/27/19 2309    Lab Status:  Final result Specimen:  Blood from Arm, Left Updated:  07/27/19 2332     Troponin I <0 02 ng/mL     Protime-INR [[id number]]  (Abnormal) Collected:  07/27/19 2309    Lab Status:  Final result Specimen:  Blood from Arm, Left Updated:  07/27/19 2325     Protime 16 3 seconds      INR 1 29    APTT [[id number]]  (Abnormal) Collected:  07/27/19 2309    Lab Status:  Final result Specimen:  Blood from Arm, Left Updated:  07/27/19 2325     PTT 41 seconds     CBC and differential [[id number]]  (Abnormal) Collected:  07/27/19 2309    Lab Status:  Final result Specimen:  Blood from Arm, Left Updated:  07/27/19 2314     WBC 12 41 Thousand/uL      RBC 5 18 Million/uL      Hemoglobin 11 4 g/dL      Hematocrit 42 9 %      MCV 83 fL      MCH 22 0 pg      MCHC 26 6 g/dL      RDW 14 5 %      MPV 12 8 fL      Platelets 048 Thousands/uL      nRBC 1 /100 WBCs      Neutrophils Relative 39 %      Immat GRANS % 2 %      Lymphocytes Relative 45 %      Monocytes Relative 4 %      Eosinophils Relative 10 %      Basophils Relative 0 %      Neutrophils Absolute 4 84 Thousands/µL      Immature Grans Absolute 0 20 Thousand/uL      Lymphocytes Absolute 5 58 Thousands/µL      Monocytes Absolute 0 48 Thousand/µL      Eosinophils Absolute 1 26 Thousand/µL      Basophils Absolute 0 05 Thousands/µL     Blood gas, arterial [[id number]]  (Abnormal) Collected:  07/27/19 2301    Lab Status:  Final result Specimen:  Blood, Arterial from Radial, Left Updated:  07/27/19 2310     pH, Arterial 6 847     pCO2, Arterial 62 3 mm Hg      pO2, Arterial 229 9 mm Hg      HCO3, Arterial 10 6 mmol/L      Base Excess, Arterial -23 2 mmol/L      O2 Content, Arterial 16 1 mL/dL      O2 HGB,Arterial  94 3 %      SOURCE Radial, Left     SERGEY TEST Yes     Vent Type- AC AC     AC Rate 22     Tidal Volume 450 ml      Inspired Air (FIO2) 100     PEEP 5    POCT Blood Gas (G3+) [[id number]]  (Abnormal) Collected:  07/27/19 2300    Lab Status:  Final result Specimen:  Venous Updated:  07/27/19 2304     ph, Compa ISTAT 6 830     pCO2, Compa i-STAT 65 7 mm HG      pO2, Compa i-STAT 47 0 mm HG      BE, i-STAT -24 mmol/L      HCO3, Compa i-STAT 11 0 mmol/L      CO2, i-STAT 13 mmol/L      O2 Sat, i-STAT 48 %      Specimen Type VENOUS    Blood culture #1 [[id number]]     Lab Status:  No result Specimen:  Blood     POCT urinalysis dipstick [[id number]]     Lab Status:  No result Specimen:  Urine                  XR chest portable    (Results Pending)         Procedures  Procedures        ED Course                               MDM  Number of Diagnoses or Management Options  Cardiac arrest [facility]):   Diagnosis management comments: 78-year-old female presenting emergency department in cardiac arrest   EMS were initially called to the house for respiratory distress and patient has a history of asthma on chart review  She has wheezing on exam as well as increased plateau pressures on the ventilator, I suspect that this is a primary respiratory distress leading to her cardiac arrest   She has a mixed acidosis on her ABG which may be due to her respiratory distress combined with prolonged down time after cardiac arrest, though another primary metabolic acidosis can't completely be excluded this time  Crossed was obtained in the emergency department and ventilator settings were improved with bronchodilator medications  She was started on hypothermia protocol due to her unresponsiveness after Ira Locker  Patient was initiated on an epinephrine drip as well as a bicarb drip  Unable to contact family  Discussed with critical care who agreed to take over care of the patient  Disposition  Final diagnoses:   Cardiac arrest ([address] )   Respiratory failure ([facility] [address] )     Time reflects when diagnosis was documented in both MDM as applicable and the Disposition within this note     Time User Action Codes Description Comment    7/27/2019 11:51 PM Saúl Saldivar 47 [I46 9] Cardiac arrest ([facility] [address] )     7/28/2019 12:44 AM Kunal, [address] [place] [address] [J96 90] Respiratory failure [facility])       ED Disposition     ED Disposition Condition Date/Time Comment    Admit Stable Sun Jul 28, 2019 12:03 AM Case was discussed with Dr Zari Massey and the patient's admission status was agreed to be Admission Status: inpatient status to the service of Dr Zair Massey   Follow-up Information    None         Patient's Medications    No medications on file     No discharge procedures on file  ED Provider  Attending physically available and evaluated Javan Mcnair  ARNOLDO managed the patient along with the ED Attending      Electronically Signed by         Mohsen Thompson MD  07/28/19 3495       Ansley Sy MD  07/28/19 0887

## 2019-07-28 NOTE — PROGRESS NOTES
BIS monitored tracing picking up pt's HR  Forehead sensor changed, cabled changed and monitor  Tracing unchanged, readings very labile  Dr Radha Colby aware of same  Believes pt may have progressed to brain death with flat EEG allowing interference from EKG monitor  Nimbex on hold, vent settings changed by Dr Radha Colby  Awaiting return of TOF to check reflexes

## 2019-07-28 NOTE — PLAN OF CARE
Problem: Prexisting or High Potential for Compromised Skin Integrity  Goal: Skin integrity is maintained or improved  Description  INTERVENTIONS:  - Identify patients at risk for skin breakdown  - Assess and monitor skin integrity  - Assess and monitor nutrition and hydration status  - Monitor labs (i e  albumin)  - Turn and reposition patient  - Assist with mobility/ambulation  - Relieve pressure over bony prominences  - Avoid friction and shearing  - Provide appropriate hygiene as needed including keeping skin clean and dry  - Evaluate need for skin moisturizer/barrier cream  - Collaborate with interdisciplinary team (i e  Nutrition, Rehabilitation, etc )   - Patient/family teaching  Outcome: Progressing     Problem: NEUROSENSORY - ADULT  Goal: Achieves stable or improved neurological status  Description  INTERVENTIONS  - Monitor and report changes in neurological status  - Initiate measures to prevent increased intracranial pressure  - Maintain blood pressure and fluid volume within ordered parameters to optimize cerebral perfusion  - Monitor temperature, glucose, and sodium or any other associated labs  Initiate appropriate interventions as ordered  - Monitor for seizure activity   - Administer anti-seizure medications as ordered  Outcome: Not Progressing     Problem: CARDIOVASCULAR - ADULT  Goal: Maintains optimal cardiac output and hemodynamic stability  Description  INTERVENTIONS:  - Monitor I/O, vital signs and rhythm  - Monitor for S/S and trends of decreased cardiac output i e  bleeding, hypotension  - Assess quality of pulses, skin color and temperature  - Assess for signs of decreased coronary artery perfusion - ex   Angina  - Instruct patient to report change in severity of symptoms  Outcome: Progressing  Goal: Absence of cardiac dysrhythmias or at baseline rhythm  Description  INTERVENTIONS:  - Continuous cardiac monitoring, monitor vital signs, obtain 12 lead EKG if indicated  - Administer antiarrhythmic and heart rate control medications as ordered  - Monitor electrolytes and administer replacement therapy as ordered  Outcome: Progressing     Problem: RESPIRATORY - ADULT  Goal: Achieves optimal ventilation and oxygenation  Description  INTERVENTIONS:  - Assess for changes in respiratory status  - Assess for changes in mentation and behavior  - Position to facilitate oxygenation and minimize respiratory effort  - Oxygen administration by appropriate delivery method based on oxygen saturation (per order) or ABGs  - Initiate smoking cessation education as indicated  - Encourage broncho-pulmonary hygiene including cough, deep breathe, Incentive Spirometry  - Assess the need for suctioning and aspirate as needed  - Assess and instruct to report SOB or any respiratory difficulty  - Respiratory Therapy support as indicated  Outcome: Progressing     Problem: METABOLIC, FLUID AND ELECTROLYTES - ADULT  Goal: Electrolytes maintained within normal limits  Description  INTERVENTIONS:  - Monitor labs and assess patient for signs and symptoms of electrolyte imbalances  - Administer electrolyte replacement as ordered  - Monitor response to electrolyte replacements, including repeat lab results as appropriate  - Instruct patient on fluid and nutrition as appropriate  Outcome: Progressing  Goal: Glucose maintained within target range  Description  INTERVENTIONS:  - Monitor Blood Glucose as ordered  - Assess for signs and symptoms of hyperglycemia and hypoglycemia  - Administer ordered medications to maintain glucose within target range  - Assess nutritional intake and initiate nutrition service referral as needed  Outcome: Progressing     Problem: SKIN/TISSUE INTEGRITY - ADULT  Goal: Skin integrity remains intact  Description  INTERVENTIONS  - Identify patients at risk for skin breakdown  - Assess and monitor skin integrity  - Assess and monitor nutrition and hydration status  - Monitor labs (i e  albumin)  - Assess for incontinence   - Turn and reposition patient  - Assist with mobility/ambulation  - Relieve pressure over bony prominences  - Avoid friction and shearing  - Provide appropriate hygiene as needed including keeping skin clean and dry  - Evaluate need for skin moisturizer/barrier cream  - Collaborate with interdisciplinary team (i e  Nutrition, Rehabilitation, etc )   - Patient/family teaching  Outcome: Progressing

## 2019-07-28 NOTE — CODE DOCUMENTATION
Compressions started on patient immediately after pulse check revealed no pulses on arrival to the ED

## 2019-07-28 NOTE — PROGRESS NOTES
Pt no longer paralyzed has 4/4 TOF  Vent settings changed by Dr Sarahi Ordaz, overbreathing ventilator  Nimbex, TOF and BIS d/c'd

## 2019-07-28 NOTE — ED ATTENDING ATTESTATION
Cornelio MCLAUGHLIN 23, DO, saw and evaluated the patient  I have discussed the patient with the resident/non-physician practitioner and agree with the resident's/non-physician practitioner's findings, Plan of Care, and MDM as documented in the resident's/non-physician practitioner's note, except where noted  All available labs and Radiology studies were reviewed  I was present for key portions of any procedure(s) performed by the resident/non-physician practitioner and I was immediately available to provide assistance  At this point I agree with the current assessment done in the Emergency Department  I have conducted an independent evaluation of this patient a history and physical is as follows:    46 y o  F p/w cardiac arrest   Pt called 911 c/o SOB and went unresponsive while on the phone with dispatch at 2157  EMS had to break into her house to gain entry at 2216  EMS noted pt had several inhalers around her house  Pt was unresponsive and in asystole upon EMS arrival   Pt was intubated with a 7 0 tube without sedation/paralytics and given 3 rounds of epi  IO was placed in her LLE by EMS  EMS obtained ROSC at 2032 and lost pulses upon ED arrival   ACLS protocol was resumed upon ED room arrival at 2241  Pt was given 2 rounds of epi and an amp of bicarb  ROSC was obtain at approx 2247 with a rhythm in sinus tachycardia  Pt had a GCS of 3T with nonreactive pupils  Pt's rhythm then deteriorated at 2301 to pulseless VT and CPR was restarted while charging for defibrillation and subsequently shocked at [facility]  ACLS was resumed  Pt was given an epi and an amp of bicarb  At next pulse check, pt was in PEA  ACLS was resumed again and given another 1mg of epi  ROSC was obtained at 2306  Pt was subsequently placed on an epi and bicarb drip  Ice packs were placed to bilateral groins/underarms for hypothermia protocol    Pt had plateau pressures in the 60s, so pt given ketamine and magnesium/solumedrol was ordered  Resident attempted to call emergency contact however it went straight to voicemail  There is no family present  Per [facility] records, pt has a h/o asthma  It is noted pt has been to [facility] for asthma exacerbations in the past   Pt admitted to the ICU       Critical Care Time  Procedures

## 2019-07-28 NOTE — PROGRESS NOTES
Progress Note - critical care  Tyree Feliz 46 y o  female MRN: [id number]  Unit/Bed#: ICU 01 Encounter: [id number]    Assessment:  1  VFib cardiac arrest   Down time around 20 minutes  2  History of asthma/COPD  3  Acute hypoxic respiratory failure  4  Undergoing therapeutic hypothermia  5  Steroid induced diabetes  Plan:  1  The patient did not do well with the assist-control mode due to air trapping and increased airway pressure, I have changed the patient to pressure controlled with IP of 30 and peep of 5   2  I have changed the I-time to 0 5 second while she is on 30 breaths per minute  3  I will obtain ABG on these settings  4  Due to persistent bronchospasm, I will start the patient on higher dose of Solu-Medrol 125 mg IV q 6 hours  5  Continue with bronchodilators  6  Serial labs per TT M protocol  7  Continue target body temperature at 34° C   8  Contact the family, patient is originally from [facility]  9  Insulin drip for glucose control  10  Propofol and fentanyl for sedation  11  Start Nimbex drip due to ongoing bronchospasm  12  Repeat ABG  13  Daily labs  14  Core measures for ICU stay has been met  15  Discussed with the staff on rounds in details, critical care time spent with patient was 60 minutes  Chief Complaint:   Review of system is not obtainable  Subjective:   As above  Objective:     Vitals: Blood pressure 148/87, pulse 92, temperature (!) 93 2 °F (34 °C), temperature source Probe, resp  rate (!) 33, height 5' (1 524 m), weight 63 kg (138 lb 14 2 oz), SpO2 100 %  ,Body mass index is 27 13 kg/m²        Intake/Output Summary (Last 24 hours) at 7/28/2019 1404  Last data filed at 7/28/2019 1200  Gross per 24 hour   Intake 4246 51 ml   Output 2875 ml   Net 1371 51 ml       Invasive Devices     Peripheral Intravenous Line            Peripheral IV 07/27/19 Left Antecubital less than 1 day    Peripheral IV 07/27/19 Right Antecubital less than 1 day    Peripheral IV 07/27/19 Right Hand less than 1 day    Peripheral IV 07/28/19 Left Hand less than 1 day          Arterial Line            Arterial Line 07/28/19 Radial less than 1 day          Drain            NG/OG/Enteral Tube Orogastric Center mouth less than 1 day    Urethral Catheter Temperature probe 16 Fr  less than 1 day          Airway            ETT  7 mm less than 1 day                Physical Exam:  Vital signs stable with blood pressure is elevated, however heart rate is variable, she has wheezing bilaterally, distant heart sounds, S1-S2, abdomen is benign, no edema, neurologic D she has pupils are midsize not reactive, no cough reflex, not responsive to tactile or verbal, over breathing the ventilator before paralytics  Labs: I have personally reviewed pertinent lab results  Imaging and other studies: I have personally reviewed pertinent reports  Chest x-ray consistent with haziness right greater than left, consistent with pleural effusion  Hyperinflated lungs noted  ET tube is supported

## 2019-07-28 NOTE — PROCEDURES
Arterial Line Insertion  Date/Time: 7/28/2019 2:21 AM  Performed by: XOCHILT Pringle  Authorized by: XOCHILT Pringle     Patient location:  Bedside  Consent:     Consent obtained:  Emergent situation  Universal protocol:     Required blood products, implants, devices, and special equipment available: yes      Site/side marked: yes      Immediately prior to procedure a time out was called: yes      Patient identity confirmed:  Hospital-assigned identification number and arm band  Indications:     Indications: hemodynamic monitoring and frequent labs / infusion    Pre-procedure details:     Skin preparation:  Chlorhexidine    Preparation: Patient was prepped and draped in sterile fashion    Anesthesia (see MAR for exact dosages): Anesthesia method:  None  Procedure details:     Location / Tip of Catheter:  Radial    Laterality:  Right    Stanford's test performed: yes      Stanford's test abnormal: no      Placement technique:  Percutaneous    Number of attempts:  2    Successful placement: yes      Transducer: waveform confirmed    Post-procedure details:     Post-procedure:  Sterile dressing applied and sutured    CMS:  Normal and unchanged    Patient tolerance of procedure:   Tolerated well, no immediate complications

## 2019-07-28 NOTE — PROGRESS NOTES
CHAD here  Instructed pt's SO to bring med list for reconciliation  Also instructed him to call pt's family in [address] [facility]

## 2019-07-28 NOTE — PLAN OF CARE
Problem: Prexisting or High Potential for Compromised Skin Integrity  Goal: Skin integrity is maintained or improved  Description  INTERVENTIONS:  - Identify patients at risk for skin breakdown  - Assess and monitor skin integrity  - Assess and monitor nutrition and hydration status  - Monitor labs (i e  albumin)  - Assess for incontinence   - Turn and reposition patient  - Assist with mobility/ambulation  - Relieve pressure over bony prominences  - Avoid friction and shearing  - Provide appropriate hygiene as needed including keeping skin clean and dry  - Evaluate need for skin moisturizer/barrier cream  - Collaborate with interdisciplinary team (i e  Nutrition, Rehabilitation, etc )   - Patient/family teaching  Outcome: Progressing     Problem: CARDIOVASCULAR - ADULT  Goal: Maintains optimal cardiac output and hemodynamic stability  Description  INTERVENTIONS:  - Monitor I/O, vital signs and rhythm  - Monitor for S/S and trends of decreased cardiac output i e  bleeding, hypotension  - Administer and titrate ordered vasoactive medications to optimize hemodynamic stability  - Assess quality of pulses, skin color and temperature  - Assess for signs of decreased coronary artery perfusion - ex   Angina  - Instruct patient to report change in severity of symptoms  Outcome: Progressing  Goal: Absence of cardiac dysrhythmias or at baseline rhythm  Description  INTERVENTIONS:  - Continuous cardiac monitoring, monitor vital signs, obtain 12 lead EKG if indicated  - Administer antiarrhythmic and heart rate control medications as ordered  - Monitor electrolytes and administer replacement therapy as ordered  Outcome: Progressing     Problem: RESPIRATORY - ADULT  Goal: Achieves optimal ventilation and oxygenation  Description  INTERVENTIONS:  - Assess for changes in respiratory status  - Assess for changes in mentation and behavior  - Position to facilitate oxygenation and minimize respiratory effort  - Oxygen administration by appropriate delivery method based on oxygen saturation (per order) or ABGs  - Initiate smoking cessation education as indicated  - Encourage broncho-pulmonary hygiene including cough, deep breathe, Incentive Spirometry  - Assess the need for suctioning and aspirate as needed  - Assess and instruct to report SOB or any respiratory difficulty  - Respiratory Therapy support as indicated  Outcome: Progressing     Problem: METABOLIC, FLUID AND ELECTROLYTES - ADULT  Goal: Electrolytes maintained within normal limits  Description  INTERVENTIONS:  - Monitor labs and assess patient for signs and symptoms of electrolyte imbalances  - Administer electrolyte replacement as ordered  - Monitor response to electrolyte replacements, including repeat lab results as appropriate  - Instruct patient on fluid and nutrition as appropriate  Outcome: Progressing     Problem: SKIN/TISSUE INTEGRITY - ADULT  Goal: Skin integrity remains intact  Description  INTERVENTIONS  - Identify patients at risk for skin breakdown  - Assess and monitor skin integrity  - Assess and monitor nutrition and hydration status  - Monitor labs (i e  albumin)  - Assess for incontinence   - Turn and reposition patient  - Assist with mobility/ambulation  - Relieve pressure over bony prominences  - Avoid friction and shearing  - Provide appropriate hygiene as needed including keeping skin clean and dry  - Evaluate need for skin moisturizer/barrier cream  - Collaborate with interdisciplinary team (i e  Nutrition, Rehabilitation, etc )   - Patient/family teaching  Outcome: Progressing     Problem: PAIN - ADULT  Goal: Verbalizes/displays adequate comfort level or baseline comfort level  Description  Interventions:  - Encourage patient to monitor pain and request assistance  - Assess pain using appropriate pain scale  - Administer analgesics based on type and severity of pain and evaluate response  - Implement non-pharmacological measures as appropriate and evaluate response  - Consider cultural and social influences on pain and pain management  - Notify physician/advanced practitioner if interventions unsuccessful or patient reports new pain  Outcome: Progressing     Problem: SAFETY ADULT  Goal: Patient will remain free of falls  Description  INTERVENTIONS:  - Assess patient frequently for physical needs  -  Identify cognitive and physical deficits and behaviors that affect risk of falls    -  [place] fall precautions as indicated by assessment   - Educate patient/family on patient safety including physical limitations  - Instruct patient to call for assistance with activity based on assessment  - Modify environment to reduce risk of injury  - Consider OT/PT consult to assist with strengthening/mobility  Outcome: Progressing

## 2019-07-28 NOTE — UTILIZATION REVIEW
Initial Clinical Review    Admission: Date/Time/Statement: 7/28/19 @ 0003 Inpatient Written     Orders Placed This Encounter   Procedures    Inpatient Admission     Standing Status:   Standing     Number of Occurrences:   1     Order Specific Question:   Admitting Physician     Answer:   Delmy Wood     Order Specific Question:   Level of Care     Answer:   Critical Care [15]     Order Specific Question:   Estimated length of stay     Answer:   More than 2 Midnights     Order Specific Question:   Certification     Answer:   I certify that inpatient services are medically necessary for this patient for a duration of greater than two midnights  See H&P and MD Progress Notes for additional information about the patient's course of treatment  ED Arrival Information     Expected Arrival Acuity Means of Arrival Escorted By Service Admission Type    - 7/27/2019 22:41 Immediate [facility] Critical Care/ICU Emergency    Arrival Complaint    cardiac arrest        Chief Complaint   Patient presents with    Cardiac Arrest     Assessment/Plan: 46 y o  female presents for admission to the ICU after cardiac arrest   Patient reportedly called 911 complaining of shortness of breath and went unresponsive mid phone call  Upon EMS arrival, responders had to kicked down the patient's front door to get into her apartment and she was found down, unresponsive, pulseless  There is approximately 15-25 minutes in between phone call and EMS arrival   CPR was initiated and patient was intubated  Patient arrested again upon arrival to the emergency department times two  Patient was defibrillated had after 2-3 doses more of epinephrine and other ACLS adjuncts, ROSC was obtained  Patient was hypotensive and was started on epinephrine drip and was noted to be severely acidotic and was started on bicarb drip  Patient underwent multiple albuterol/ipratropium nebulizers for severe bronchospasm in tight airways    Patient was noted to have poor lung compliance with elevated peak pressures in the 50-60's  Respiratory therapy attempted to maximize ventilator settings to make compliance however patient is persistently having elevated peak pressures  Oxygenation was not an issue  Patient also received in the emergency department magnesium, Solu-Medrol, ketamine and a dose of vecuronium by critical care  It appears that ketamine and vecuronium help the most with peak pressures however they remain to be the mid 40s to 50s  Plan:  ICU,  initiate cardene drip, bolus of hydralazine, start TTM, trops, consider echo, continue neb txs, mechanical ventilation, labs, IVF resuscitation, bicard drip    ED Triage Vitals   Temperature Pulse Respirations Blood Pressure SpO2   07/27/19 2348 07/27/19 2248 07/27/19 2248 07/27/19 2248 07/27/19 2248   (!) 93 °F (33 9 °C) (!) 125 (!) 30 (!) 196/94 100 %      Temp Source Heart Rate Source Patient Position - Orthostatic VS BP Location FiO2 (%)   07/27/19 2348 07/27/19 2253 07/27/19 2253 07/27/19 2253 --   Bladder Monitor Lying Right arm       Pain Score       07/27/19 2353       No Pain        Wt Readings from Last 1 Encounters:   07/28/19 63 kg (138 lb 14 2 oz)     Additional Vital Signs:   Date/Time  Temp  Pulse  Resp  BP  MAP (mmHg)  Arterial Line BP  MAP  SpO2  O2 Device  Patient Position - Orthostatic VS   07/28/19 0600  93 4 °F (34 1 °C)Abnormal   98  28Abnormal       134/66  90 mmHg  100 %       07/28/19 0500  93 °F (33 9 °C)Abnormal   100  27Abnormal   148/87  119  164/80  108 mmHg  100 %       07/28/19 0400  93 9 °F (34 4 °C)Abnormal   98  27Abnormal   142/92  115  140/84  108 mmHg  100 %       07/28/19 0334  94 2 °F (34 6 °C)Abnormal                      07/28/19 0330  94 6 °F (34 8 °C)Abnormal                      07/28/19 0315  95 °F (35 °C)Abnormal                      07/28/19 0300  95 4 °F (35 2 °C)Abnormal   106Abnormal   28Abnormal   141/85  104  134/72 94 mmHg  100 %       07/28/19 0245  96 1 °F (35 6 °C)Abnormal                      07/28/19 0230  96 8 °F (36 °C)Abnormal                      07/28/19 0215  97 5 °F (36 4 °C)                     07/28/19 0200  97 9 °F (36 6 °C)  126Abnormal   27Abnormal   162/96  122      100 %       07/28/19 0130  97 9 °F (36 6 °C)                     07/28/19 0125        189/113Abnormal                07/28/19 0032  97 2 °F (36 2 °C)Abnormal   134Abnormal     172/98Abnormal         98 %     Sitting   O2 Device: Vent at 07/28/19 0032   07/27/19 2359  95 9 °F (35 5 °C)Abnormal   139Abnormal   27Abnormal   151/99        99 %    Lying   07/27/19 2353  95 2 °F (35 1 °C)Abnormal   136Abnormal    20  139/92         98 %  Other (comment)   Lying    O2 Device: ETT Tube at 07/27/19 2353   07/27/19 2348  93 °F (33 9 °C)Abnormal                      07/27/19 2329    123Abnormal   42Abnormal   64/44Abnormal         99 %     Lying   O2 Device: intubated at 07/27/19 2329   07/27/19 23:25:10    117Abnormal     58/38Abnormal         98 %       07/27/19 23:20:58    107Abnormal   50Abnormal   49/26Abnormal         98 %       07/27/19 23:06:09    131Abnormal   30Abnormal           99 %    Lying   07/27/19 2256                        O2 Device: intubated at 07/27/19 2256 07/27/19 22:53:34    136Abnormal     79/48Abnormal         99 %     Lying   O2 Device: intubated at 07/27/19 2253 07/27/19 22:48:10    125Abnormal   30Abnormal   196/94Abnormal         100 %         Pertinent Labs/Diagnostic Test Results:   Results from last 7 days   Lab Units 07/28/19  0308 07/27/19  2309   WBC Thousand/uL  --  12 41*   HEMOGLOBIN g/dL  --  11 4*   HEMATOCRIT %  --  42 9   PLATELETS Thousands/uL 248 260   NEUTROS ABS Thousands/µL  --  4 84     Results from last 7 days   Lab Units 07/28/19  0308 07/27/19  2309 07/27/19  2300   SODIUM mmol/L 145 140  --    POTASSIUM mmol/L 2 9* 5 8*  --    CHLORIDE mmol/L 106 105  --    CO2 mmol/L 25 14*  --    CO2, I-STAT mmol/L  --   --  13*   ANION GAP mmol/L 14* 21*  --    BUN mg/dL 22 20  --    CREATININE mg/dL 1 50* 1 53*  --    EGFR ml/min/1 73sq m 39 38  --    CALCIUM mg/dL 6 4* 9 0  --    MAGNESIUM mg/dL  --  2 7*  --      Results from last 7 days   Lab Units 07/27/19  2309   AST U/L 31   ALT U/L 25   ALK PHOS U/L 47   TOTAL PROTEIN g/dL 5 1*   ALBUMIN g/dL 2 5*   TOTAL BILIRUBIN mg/dL 0 10*     Results from last 7 days   Lab Units 07/28/19  0546 07/28/19  0346   POC GLUCOSE mg/dl 386* 454*     Results from last 7 days   Lab Units 07/28/19  0308 07/27/19  2309   GLUCOSE RANDOM mg/dL 418* 356*     Results from last 7 days   Lab Units 07/28/19  0310 07/27/19  2301   PH ART  7 297* 6 847*   PCO2 ART mm Hg 46 2* 62 3*   PO2 ART mm Hg 120 7 229 9*   HCO3 ART mmol/L 22 1 10 6*   BASE EXC ART mmol/L -4 4 -23 2   O2 CONTENT ART mL/dL 17 7 16 1   O2 HGB, ARTERIAL % 96 7 94 3   ABG SOURCE  Line, Arterial Radial, Left     Results from last 7 days   Lab Units 07/27/19  2300   PH, [place]Y I-STAT  6 830*   PCO2, [place] ISTAT mm HG 65 7*   PO2, [place]Y ISTAT mm HG 47 0*   HCO3, [place]Y ISTAT mmol/L 11 0*   I STAT BASE EXC mmol/L -24*   I STAT O2 SAT % 48*     Results from last 7 days   Lab Units 07/27/19 2309   TROPONIN I ng/mL <0 02     Results from last 7 days   Lab Units 07/27/19  2309   PROTIME seconds 16 3*   INR  1 29*   PTT seconds 41*     Results from last 7 days   Lab Units 07/28/19  0308 07/27/19  2313   LACTIC ACID mmol/L 3 7* 14 4*     Results from last 7 days   Lab Units 07/27/19  2309   NT-PRO BNP pg/mL 183*     7/27 CXR:  PENDING  7/27 EKG:  ST WITH INVERTED TWAVES  ED Treatment:   Medication Administration from 07/27/2019 2241 to 07/28/2019 0057       Date/Time Order Dose Route Action     07/27/2019 0876 albuterol (2 5 mg/3 mL) 0 083 % inhalation solution **ADS Override Pull**    Given     07/27/2019 2244 EPINEPHrine (ADRENALIN) injection 1 mg Intravenous Given     07/27/2019 2317 EPINEPHrine 3000 mcg (STANDARD CONCENTRATION) IV in sodium chloride 0 9% 250 mL 1 mcg/min Intravenous New Bag     07/27/2019 2304 EPINEPHrine (ADRENALIN) injection 1 mg Intravenous Given     07/27/2019 2320 sodium bicarbonate 50 mEq/50 mL injection    Given     07/27/2019 2307 sodium bicarbonate 50 mEq/50 mL injection 50 mEq Intravenous Given     07/27/2019 2305 sodium bicarbonate 50 mEq/50 mL injection 50 mEq Intravenous Given     07/27/2019 2355 ketamine (KETALAR) 50 mg/mL **ADS Override Pull**    Given by Other     07/28/2019 0001 Ketamine HCl 71 mg 100 mg Intravenous Given     07/27/2019 2355 ipratropium-albuterol (DUO-NEB) 0 5-2 5 mg/3 mL inhalation solution **ADS Override Pull**    Given     07/27/2019 2337 Ketamine HCl 71 mg 100 mg Intravenous Given     07/28/2019 0018 magnesium sulfate 2 g/50 mL IVPB (premix) 2 g 2 g Intravenous New Bag     07/28/2019 0013 methylPREDNISolone sodium succinate (Solu-MEDROL) injection 125 mg 125 mg Intravenous Given     07/27/2019 2352 sodium chloride 0 9 % bolus 1,000 mL 1,000 mL Intravenous New Bag     07/27/2019 2352 sodium chloride 0 9 % bolus 1,000 mL 1,000 mL Intravenous New Bag     07/27/2019 2320 sodium bicarbonate 50 mEq/50 mL injection 50 mEq Intravenous Given     07/28/2019 0022 vecuronium (NORCURON) injection 5 mg 5 mg Intravenous Given        No past medical history on file    Present on Admission:   Acute respiratory failure with hypoxia and hypercapnia (HCC)   [facility] arrest [facility] )   Acute kidney injury ([facility] )   Asthma exacerbation   COPD (chronic obstructive pulmonary disease) ([facility] )   Smoker   Metabolic acidosis   Anoxic encephalopathy (HCC)      Admitting Diagnosis: Cardiac arrest ([facility] ) [I46 9]  Respiratory failure ([facility] ) [J96 90]  Age/Sex: 46 y o  female  Admission Orders:  ICU, Cardio-Pulmonary Monitoring  Intubation with mechanical ventilation  NPO, Dysphagia assessment prior to starting diet  FALL PRECAUTION  NG TUBE  Daily weights, I/O  NEURO CHECKS Q2H  PCXR  BLOOD CXS PENDING  ABG  SCD  Current Facility-Administered Medications:  acetaminophen 975 mg Oral Q6H   busPIRone 7 5 mg Oral Q8H   chlorhexidine 15 mL Swish [employer] Spit Q12H [address]   famotidine 20 mg Oral BID   fentaNYL 25 mcg/hr Intravenous Continuous   fentanyl citrate (PF) 50 mcg Intravenous Q2H PRN   heparin (porcine) 5,000 Units Subcutaneous Q8H [address]   insulin regular (HumuLIN R,NovoLIN R) infusion 0 3-21 Units/hr Intravenous Titrated   ipratropium 0 5 mg Nebulization Q6H   levalbuterol 1 25 mg Nebulization Q6H   meperidine 25 mg Intravenous Q4H PRN   methylPREDNISolone sodium succinate 40 mg Intravenous Q8H [address]   niCARdipine 1-15 mg/hr Intravenous Titrated   norepinephrine      potassium chloride 20 mEq Intravenous Q2H   propofol 5-50 mcg/kg/min Intravenous Titrated   sodium bicarbonate infusion 150 mL/hr Intravenous Continuous       IP CONSULT TO CASE MANAGEMENT    Network Utilization Review Department  Phone: [phone]; Fax [phone]  [email or web address]  [email or web address]  ATTENTION: Please call with any questions or concerns to [phone]  and carefully listen to the prompts so that you are directed to the right person  Send all requests for admission clinical reviews, approved or denied determinations and any other requests to fax [phone]   All voicemails are confidential

## 2019-07-29 NOTE — PROCEDURES
Arterial Line Insertion  Date/Time: 7/29/2019 1:04 PM  Performed by: XOCHILT Dewitt  Authorized by: XOCHILT Dewitt     Patient location:  Bedside  Consent:     Consent obtained:  Emergent situation  Universal protocol:     Site/side marked: yes      Immediately prior to procedure a time out was called: yes      Patient identity confirmed:  Arm band and hospital-assigned identification number  Indications:     Indications: hemodynamic monitoring and continuous blood pressure monitoring    Pre-procedure details:     Skin preparation:  Chlorhexidine    Preparation: Patient was prepped and draped in sterile fashion    Anesthesia (see MAR for exact dosages): Anesthesia method:  None  Procedure details:     Location / Tip of Catheter:  Radial    Laterality:  Left    Stanford's test performed: yes      Stanford's test abnormal: no      Needle gauge:  20 G    Placement technique:  Percutaneous    Number of attempts:  1    Successful placement: yes    Post-procedure details:     Post-procedure:  Sterile dressing applied and sutured    CMS:  Normal    Patient tolerance of procedure:   Tolerated well, no immediate complications

## 2019-07-29 NOTE — PLAN OF CARE
Problem: Prexisting or High Potential for Compromised Skin Integrity  Goal: Skin integrity is maintained or improved  Description  INTERVENTIONS:  - Identify patients at risk for skin breakdown  - Assess and monitor skin integrity  - Assess and monitor nutrition and hydration status  - Monitor labs (i e  albumin)  - Assess for incontinence   - Turn and reposition patient  - Assist with mobility/ambulation  - Relieve pressure over bony prominences  - Avoid friction and shearing  - Provide appropriate hygiene as needed including keeping skin clean and dry  - Evaluate need for skin moisturizer/barrier cream  - Collaborate with interdisciplinary team (i e  Nutrition, Rehabilitation, etc )   - Patient/family teaching  Outcome: Progressing     Problem: NEUROSENSORY - ADULT  Goal: Achieves stable or improved neurological status  Description  INTERVENTIONS  - Monitor and report changes in neurological status  - Initiate measures to prevent increased intracranial pressure  - Maintain blood pressure and fluid volume within ordered parameters to optimize cerebral perfusion  - Monitor temperature, glucose, and sodium or any other associated labs  Initiate appropriate interventions as ordered  - Monitor for seizure activity   - Administer anti-seizure medications as ordered  Outcome: Progressing     Problem: CARDIOVASCULAR - ADULT  Goal: Maintains optimal cardiac output and hemodynamic stability  Description  INTERVENTIONS:  - Monitor I/O, vital signs and rhythm  - Monitor for S/S and trends of decreased cardiac output i e  bleeding, hypotension  - Administer and titrate ordered vasoactive medications to optimize hemodynamic stability  - Assess quality of pulses, skin color and temperature  - Assess for signs of decreased coronary artery perfusion - ex   Angina  - Instruct patient to report change in severity of symptoms  Outcome: Progressing  Goal: Absence of cardiac dysrhythmias or at baseline rhythm  Description  INTERVENTIONS:  - Continuous cardiac monitoring, monitor vital signs, obtain 12 lead EKG if indicated  - Administer antiarrhythmic and heart rate control medications as ordered  - Monitor electrolytes and administer replacement therapy as ordered  Outcome: Progressing     Problem: RESPIRATORY - ADULT  Goal: Achieves optimal ventilation and oxygenation  Description  INTERVENTIONS:  - Assess for changes in respiratory status  - Assess for changes in mentation and behavior  - Position to facilitate oxygenation and minimize respiratory effort  - Oxygen administration by appropriate delivery method based on oxygen saturation (per order) or ABGs  - Initiate smoking cessation education as indicated  - Encourage broncho-pulmonary hygiene including cough, deep breathe, Incentive Spirometry  - Assess the need for suctioning and aspirate as needed  - Assess and instruct to report SOB or any respiratory difficulty  - Respiratory Therapy support as indicated  Outcome: Progressing     Problem: METABOLIC, FLUID AND ELECTROLYTES - ADULT  Goal: Electrolytes maintained within normal limits  Description  INTERVENTIONS:  - Monitor labs and assess patient for signs and symptoms of electrolyte imbalances  - Administer electrolyte replacement as ordered  - Monitor response to electrolyte replacements, including repeat lab results as appropriate  - Instruct patient on fluid and nutrition as appropriate  Outcome: Progressing  Goal: Glucose maintained within target range  Description  INTERVENTIONS:  - Monitor Blood Glucose as ordered  - Assess for signs and symptoms of hyperglycemia and hypoglycemia  - Administer ordered medications to maintain glucose within target range  - Assess nutritional intake and initiate nutrition service referral as needed  Outcome: Progressing     Problem: SKIN/TISSUE INTEGRITY - ADULT  Goal: Skin integrity remains intact  Description  INTERVENTIONS  - Identify patients at risk for skin breakdown  - Assess and monitor skin integrity  - Assess and monitor nutrition and hydration status  - Monitor labs (i e  albumin)  - Assess for incontinence   - Turn and reposition patient  - Assist with mobility/ambulation  - Relieve pressure over bony prominences  - Avoid friction and shearing  - Provide appropriate hygiene as needed including keeping skin clean and dry  - Evaluate need for skin moisturizer/barrier cream  - Collaborate with interdisciplinary team (i e  Nutrition, Rehabilitation, etc )   - Patient/family teaching  Outcome: Progressing     Problem: PAIN - ADULT  Goal: Verbalizes/displays adequate comfort level or baseline comfort level  Description  Interventions:  - Encourage patient to monitor pain and request assistance  - Assess pain using appropriate pain scale  - Administer analgesics based on type and severity of pain and evaluate response  - Implement non-pharmacological measures as appropriate and evaluate response  - Consider cultural and social influences on pain and pain management  - Notify physician/advanced practitioner if interventions unsuccessful or patient reports new pain  Outcome: Progressing     Problem: INFECTION - ADULT  Goal: Absence or prevention of progression during hospitalization  Description  INTERVENTIONS:  - Assess and monitor for signs and symptoms of infection  - Monitor lab/diagnostic results  - Monitor all insertion sites, i e  indwelling lines, tubes, and drains  - Monitor endotracheal (as able) and nasal secretions for changes in amount and color  - [place] appropriate cooling/warming therapies per order  - Administer medications as ordered  - Instruct and encourage patient and family to use good hand hygiene technique  - Identify and instruct in appropriate isolation precautions for identified infection/condition  Outcome: Progressing  Goal: Absence of fever/infection during neutropenic period  Description  INTERVENTIONS:  - Monitor WBC  - Implement neutropenic guidelines  Outcome: Progressing     Problem: SAFETY ADULT  Goal: Patient will remain free of falls  Description  INTERVENTIONS:  - Assess patient frequently for physical needs  -  Identify cognitive and physical deficits and behaviors that affect risk of falls    -  [place] fall precautions as indicated by assessment   - Educate patient/family on patient safety including physical limitations  - Instruct patient to call for assistance with activity based on assessment  - Modify environment to reduce risk of injury  - Consider OT/PT consult to assist with strengthening/mobility  Outcome: Progressing  Goal: Maintain or return to baseline ADL function  Description  INTERVENTIONS:  -  Assess patient's ability to carry out ADLs; assess patient's baseline for ADL function and identify physical deficits which impact ability to perform ADLs (bathing, care of mouth/teeth, toileting, grooming, dressing, etc )  - Assess/evaluate cause of self-care deficits   - Assess range of motion  - Assess patient's mobility; develop plan if impaired  - Assess patient's need for assistive devices and provide as appropriate  - Encourage maximum independence but intervene and supervise when necessary  ¯ Involve family in performance of ADLs  ¯ Assess for home care needs following discharge   ¯ Request OT consult to assist with ADL evaluation and planning for discharge  ¯ Provide patient education as appropriate  Outcome: Progressing  Goal: Maintain or return mobility status to optimal level  Description  INTERVENTIONS:  - Assess patient's baseline mobility status (ambulation, transfers, stairs, etc )    - Identify cognitive and physical deficits and behaviors that affect mobility  - Identify mobility aids required to assist with transfers and/or ambulation (gait belt, sit-to-stand, lift, walker, cane, etc )  - [place] fall precautions as indicated by assessment  - Record patient progress and toleration of activity level on Mobility SBAR; progress patient to next Phase/Stage  - Instruct patient to call for assistance with activity based on assessment  - Request Rehabilitation consult to assist with strengthening/weightbearing, etc   Outcome: Progressing     Problem: DISCHARGE PLANNING  Goal: Discharge to home or other facility with appropriate resources  Description  INTERVENTIONS:  - Identify barriers to discharge w/patient and caregiver  - Arrange for needed discharge resources and transportation as appropriate  - Identify discharge learning needs (meds, wound care, etc )  - Arrange for interpretive services to assist at discharge as needed  - Refer to Case Management Department for coordinating discharge planning if the patient needs post-hospital services based on physician/advanced practitioner order or complex needs related to functional status, cognitive ability, or social support system  Outcome: Progressing     Problem: Knowledge Deficit  Goal: Patient/family/caregiver demonstrates understanding of disease process, treatment plan, medications, and discharge instructions  Description  Complete learning assessment and assess knowledge base  Interventions:  - Provide teaching at level of understanding  - Provide teaching via preferred learning methods  Outcome: Progressing     Problem: Potential for Falls  Goal: Patient will remain free of falls  Description  INTERVENTIONS:  - Assess patient frequently for physical needs  -  Identify cognitive and physical deficits and behaviors that affect risk of falls    -  [place] fall precautions as indicated by assessment   - Educate patient/family on patient safety including physical limitations  - Instruct patient to call for assistance with activity based on assessment  - Modify environment to reduce risk of injury  - Consider OT/PT consult to assist with strengthening/mobility  Outcome: Progressing

## 2019-07-29 NOTE — PROGRESS NOTES
Progress Note - Critical Care   Yohan Brock 46 y o  female MRN: [id number]  Unit/Bed#: ICU 01 Encounter: [id number]    Assessment/Plan:  1  VFib cardiac arrest likely secondary to [de-identified] acute respiratory arrest in the setting of severe asthma with exacerbation  · We will continue with vent support for now with titration of her vent settings to maintain a normal pH and a saturation greater than 90%  · She was treated with targeted temperature management however she was warmed a few hours prematurely because of a prolonged QTC interval  · We will continue with frequent neuro checks  2  Acute encephalopathy, likely related to anoxic injury  · We will continue with frequent neuro checks as noted above  · Her pupils are fixed and dilated and she is no longer having any spontaneous respirations  · Messages were left with potential family members but we have not heard back from them as of yet  · Prognosis is grim  3  Severe asthma/COPD with acute exacerbation likely a contributing factor to the development of 1    · We will continue with Solu-Medrol for now  · Will continue with bronchodilators  4  Steroid induced hyperglycemia  · Overnight the patient's blood sugar was low and the insulin infusion was put on hold  · We will continue to monitor her blood glucose levels closely  5  Metabolic acidosis- resolved  6  Acute kidney injury on unknown chronic kidney disease status  · Will continue to monitor her renal indices and urine output closely    Critical Care Time:   Documented critical care time excludes any procedures documented elsewhere  It also excludes any family updates    _____________________________________________________________________    HPI/24hr events:   Overnight the patient was placed on assist-control ventilation because of a declining respiratory effort  Throughout the night her neurological exam continue to decline despite rewarming    This morning she is noted to have fixed dilated pupils and no evidence of spontaneous respiration    Medications:    Current Facility-Administered Medications:  acetaminophen 975 mg Oral Q6H Augustin ALVARENGA PA-C    busPIRone 7 5 mg Oral Q8H Desiree Rail Spatzer, CRNP    chlorhexidine 15 mL Swish & Spit Q12H [address] Tor Rios MD    famotidine 20 mg Oral BID Nilson ALVARENGA PA-C    fentaNYL 25 mcg/hr Intravenous Continuous Nilson ALVARENGA PA-C Last Rate: Stopped (07/29/19 0129)   fentanyl citrate (PF) 50 mcg Intravenous Q2H PRN Augustin ALVARENGA PA-C    heparin (porcine) 5,000 Units Subcutaneous [facility] Augustin ALVARENGA PA-C    insulin regular (HumuLIN R,NovoLIN R) infusion 0 3-21 Units/hr Intravenous Titrated Nilson ALVARENGA PA-C Last Rate: Stopped (07/28/19 2215)   ipratropium 0 5 mg Nebulization Q6H Juan J Raymundo MD    Labetalol HCl 10 mg Intravenous Q6H PRN XOCHILT Khan    levalbuterol 1 25 mg Nebulization Q6H Augustin ALVARENGA PA-C    meperidine 25 mg Intravenous Q4H PRN Nilson ALVARENGA PA-C    methylPREDNISolone sodium succinate 125 mg Intravenous Q6H [address] XOCHILT Stephen    niCARdipine 1-15 mg/hr Intravenous Titrated Aguustin ALVARENGA PA-C Last Rate: Stopped (07/28/19 0300)   norepinephrine 1-30 mcg/min Intravenous Titrated XOCHILT Khan Last Rate: 25 mcg/min (07/29/19 0412)   propofol 5-50 mcg/kg/min Intravenous Titrated Augustin ALVARENGA PA-C Last Rate: Stopped (07/29/19 0129)   sodium bicarbonate infusion 150 mL/hr Intravenous Continuous Augustin ALVARENGA PA-C Last Rate: Stopped (07/28/19 1946)         fentaNYL 25 mcg/hr Last Rate: Stopped (07/29/19 0129)   insulin regular (HumuLIN R,NovoLIN R) infusion 0 3-21 Units/hr Last Rate: Stopped (07/28/19 2215)   niCARdipine 1-15 mg/hr Last Rate: Stopped (07/28/19 0300)   norepinephrine 1-30 mcg/min Last Rate: 25 mcg/min (07/29/19 6282)   propofol 5-50 mcg/kg/min Last Rate: Stopped (07/29/19 0129)   sodium bicarbonate infusion 150 mL/hr Last Rate: Stopped (07/28/19 1946)         Physical exam:  Vitals: Body mass index is 27 13 kg/m²  Blood pressure 117/70, pulse (!) 112, temperature 97 9 °F (36 6 °C), resp  rate 18, height 5' (1 524 m), weight 63 kg (138 lb 14 2 oz), SpO2 95 %  ,  Temp  Min: 92 1 °F (33 4 °C)  Max: 97 9 °F (36 6 °C)  IBW: 45 5 kg    SpO2: 95 %  SpO2 Activity: At Rest  O2 Device: (Vent)      Intake/Output Summary (Last 24 hours) at 7/29/2019 0600  Last data filed at 7/29/2019 0500  Gross per 24 hour   Intake 3910 04 ml   Output 3530 ml   Net 380 04 ml       Invasive/non-invasive ventilation settings:   Respiratory    Lab Data (Last 4 hours)      07/29 0413            pH, Arterial       7 407           pCO2, Arterial       46 7           pO2, Arterial       85 9           HCO3, Arterial       28 7           Base Excess, Arterial       3 4                O2/Vent Data           Most Recent         Vent Mode   AC/VC      Resp Rate (BPM) (BPM)   18      Vt (mL) (mL)   380      FIO2 (%) (%)   40      PEEP (cmH2O) (cmH2O)   5      MV   7 64                Invasive Devices     Peripheral Intravenous Line            Peripheral IV 07/27/19 Left Antecubital 1 day    Peripheral IV 07/27/19 Right Antecubital 1 day    Peripheral IV 07/27/19 Right Hand 1 day    Peripheral IV 07/28/19 Left Hand 1 day    Peripheral IV 07/28/19 Distal;Left;Upper;Ventral (anterior) Arm less than 1 day    Peripheral IV 07/28/19 Left Antecubital less than 1 day          Arterial Line            Arterial Line 07/28/19 Right Radial less than 1 day          Drain            NG/OG/Enteral Tube Orogastric Center mouth 1 day    Urethral Catheter Temperature probe 16 Fr  1 day          Airway            ETT  7 mm 1 day                  Physical Exam:  Gen:  Unresponsive  HEENT:  Pupils are dilated and fixed, the oropharynx is intubated but otherwise without erythema  Neck:  Supple negative for lymphadenopathy  Chest:  There are fine expiratory wheezes in the lung fields bilaterally  Cor:  Regular rate and rhythm  Abd: Obese but soft and nontender  Ext:  There is no significant edema clubbing or cyanosis  Neuro:  Unresponsive to even painful stimuli  She has no evidence of spontaneous respirations  Pupils are fixed and dilated  Skin:  Warm and dry      Diagnostic Data:  Lab: I have personally reviewed pertinent lab results  CBC:   Results from last 7 days   Lab Units 07/29/19  0410 07/28/19  0616 07/28/19  0308 07/27/19  2309   WBC Thousand/uL 26 73* 24 44*  --  12 41*   HEMOGLOBIN g/dL 12 6 13 0  --  11 4*   HEMATOCRIT % 41 6 44 1  --  42 9   PLATELETS Thousands/uL 265 324 248 260       CMP:   Results from last 7 days   Lab Units 07/29/19  0410 07/28/19  2357 07/28/19  1610  07/28/19  0615  07/27/19  2309   SODIUM mmol/L 141 139 142   < > 147*   < > 140   POTASSIUM mmol/L 4 5 4 1 3 8   < > 3 6   < > 5 8*   CHLORIDE mmol/L 103 103 102   < > 108   < > 105   CO2 mmol/L 29 29 30   < > 24   < > 14*   BUN mg/dL 19 19 20   < > 21   < > 20   CREATININE mg/dL 1 01 0 88 1 23   < > 1 80*   < > 1 53*   CALCIUM mg/dL 7 8* 8 2* 7 1*   < > 6 8*   < > 9 0   ALK PHOS U/L  --   --   --   --  59  --  47   ALT U/L  --   --   --   --  149*  --  25   AST U/L  --   --   --   --  281*  --  31    < > = values in this interval not displayed  PT/INR:   Lab Results   Component Value Date    INR 0 85 07/29/2019    INR 0 87 07/28/2019    INR 0 93 07/28/2019   ,   Magnesium:   Results from last 7 days   Lab Units 07/29/19  0410 07/28/19  2357 07/28/19  1610   MAGNESIUM mg/dL 2 0 2 2 2 3     Phosphorous:   Results from last 7 days   Lab Units 07/29/19  0410 07/28/19  2357 07/28/19  1610   PHOSPHORUS mg/dL 5 7* 5 2* 2 9       Microbiology:        Imaging:      Cardiac lab/EKG/telemetry/ECHO:   Sinus rhythm    VTE Prophylaxis:  SCDs    Code Status: Level 1 - Full Code    XOCHILT Winston    Portions of the record may have been created with voice recognition software   Occasional wrong word or "sound a like" substitutions may have occurred due to the inherent limitations of voice recognition software  Read the chart carefully and recognize, using context, where substitutions have occurred

## 2019-07-29 NOTE — PLAN OF CARE
Problem: Prexisting or High Potential for Compromised Skin Integrity  Goal: Skin integrity is maintained or improved  Description  INTERVENTIONS:  - Identify patients at risk for skin breakdown  - Assess and monitor skin integrity  - Assess and monitor nutrition and hydration status  - Monitor labs (i e  albumin)  - Assess for incontinence   - Turn and reposition patient  - Assist with mobility/ambulation  - Relieve pressure over bony prominences  - Avoid friction and shearing  - Provide appropriate hygiene as needed including keeping skin clean and dry  - Evaluate need for skin moisturizer/barrier cream  - Collaborate with interdisciplinary team (i e  Nutrition, Rehabilitation, etc )   - Patient/family teaching  Outcome: Progressing     Problem: CARDIOVASCULAR - ADULT  Goal: Maintains optimal cardiac output and hemodynamic stability  Description  INTERVENTIONS:  - Monitor I/O, vital signs and rhythm  - Monitor for S/S and trends of decreased cardiac output i e  bleeding, hypotension  - Administer and titrate ordered vasoactive medications to optimize hemodynamic stability  - Assess quality of pulses, skin color and temperature  - Assess for signs of decreased coronary artery perfusion - ex   Angina  - Instruct patient to report change in severity of symptoms  Outcome: Progressing     Problem: METABOLIC, FLUID AND ELECTROLYTES - ADULT  Goal: Electrolytes maintained within normal limits  Description  INTERVENTIONS:  - Monitor labs and assess patient for signs and symptoms of electrolyte imbalances  - Administer electrolyte replacement as ordered  - Monitor response to electrolyte replacements, including repeat lab results as appropriate  - Instruct patient on fluid and nutrition as appropriate  Outcome: Progressing  Goal: Glucose maintained within target range  Description  INTERVENTIONS:  - Monitor Blood Glucose as ordered  - Assess for signs and symptoms of hyperglycemia and hypoglycemia  - Administer ordered medications to maintain glucose within target range  - Assess nutritional intake and initiate nutrition service referral as needed  Outcome: Progressing     Problem: SKIN/TISSUE INTEGRITY - ADULT  Goal: Skin integrity remains intact  Description  INTERVENTIONS  - Identify patients at risk for skin breakdown  - Assess and monitor skin integrity  - Assess and monitor nutrition and hydration status  - Monitor labs (i e  albumin)  - Assess for incontinence   - Turn and reposition patient  - Assist with mobility/ambulation  - Relieve pressure over bony prominences  - Avoid friction and shearing  - Provide appropriate hygiene as needed including keeping skin clean and dry  - Evaluate need for skin moisturizer/barrier cream  - Collaborate with interdisciplinary team (i e  Nutrition, Rehabilitation, etc )   - Patient/family teaching  Outcome: Progressing     Problem: PAIN - ADULT  Goal: Verbalizes/displays adequate comfort level or baseline comfort level  Description  Interventions:  - Encourage patient to monitor pain and request assistance  - Assess pain using appropriate pain scale  - Administer analgesics based on type and severity of pain and evaluate response  - Implement non-pharmacological measures as appropriate and evaluate response  - Consider cultural and social influences on pain and pain management  - Notify physician/advanced practitioner if interventions unsuccessful or patient reports new pain  Outcome: Progressing     Problem: INFECTION - ADULT  Goal: Absence or prevention of progression during hospitalization  Description  INTERVENTIONS:  - Assess and monitor for signs and symptoms of infection  - Monitor lab/diagnostic results  - Monitor all insertion sites, i e  indwelling lines, tubes, and drains  - Monitor endotracheal (as able) and nasal secretions for changes in amount and color  - [place] appropriate cooling/warming therapies per order  - Administer medications as ordered  - Instruct and encourage patient and family to use good hand hygiene technique  - Identify and instruct in appropriate isolation precautions for identified infection/condition  Outcome: Progressing  Goal: Absence of fever/infection during neutropenic period  Description  INTERVENTIONS:  - Monitor WBC  - Implement neutropenic guidelines  Outcome: Progressing     Problem: SAFETY ADULT  Goal: Patient will remain free of falls  Description  INTERVENTIONS:  - Assess patient frequently for physical needs  -  Identify cognitive and physical deficits and behaviors that affect risk of falls  -  [place] fall precautions as indicated by assessment   - Educate patient/family on patient safety including physical limitations  - Instruct patient to call for assistance with activity based on assessment  - Modify environment to reduce risk of injury  - Consider OT/PT consult to assist with strengthening/mobility  Outcome: Progressing     Problem: Potential for Falls  Goal: Patient will remain free of falls  Description  INTERVENTIONS:  - Assess patient frequently for physical needs  -  Identify cognitive and physical deficits and behaviors that affect risk of falls    -  [place] fall precautions as indicated by assessment   - Educate patient/family on patient safety including physical limitations  - Instruct patient to call for assistance with activity based on assessment  - Modify environment to reduce risk of injury  - Consider OT/PT consult to assist with strengthening/mobility  Outcome: Progressing

## 2019-07-29 NOTE — DEATH NOTE
INPATIENT DEATH NOTE  Ender Peguero 46 y o  female MRN: [id number]  Unit/Bed#: ICU 01 Encounter: [id number]    Date, Time and Cause of Death    Date of Death:  19  Time of Death:   4:09 PM  Preliminary Cause of Death:  Anoxic brain injury ([facility] [address] )  Entered by: Teagan LEMON[TB1 1]     Attribution     TB1  [facility] 19 18:06           Patient's Information  Pronounced by: Dr Truong Auguste  Did the patient's death occur in the ED?: No  Did the patient's death occur in the OR?: No  Did the patient's death occur less than 10 days post-op?: No  Did the patient's death occur within 24 hours of admission?: No  Was code status DNR at the time of death?: No    PHYSICAL EXAM:  Unresponsive to noxious stimuli, Pupillary light reflex absent and Corneal blink reflex absent    Medical Examiner notification criteria:  NONE APPLICABLE   Medical Examiner's office notified?:  Yes   Medical Examiner accepted case?:  No  Name of Medical Examiner: [facility] ---notified awaiting response    Family Notification  Was the family notified?: Yes  Date Notified: 19  Time Notified: 6755  Notified by:  Laith Mendosa  Name of Family Notified of Death: Brother, and Lefty-boyfriend   Relationship to Patient: Partner, Brother  Family Notification Route: Telephone    Autopsy Options:  Awaiting family to make decisions    Primary Service Attending Physician notified?:  yes - Attending:  Garrett Noel MD    Physician/Resident responsible for completing Discharge Summary:  Laith Mendosa

## 2019-07-29 NOTE — PROGRESS NOTES
The patient had a CT of the head which showed diffuse cerebral edema with loss of gray-white matter differentiation consistent with diffuse anoxic injury  Nuclear cerebral blood flow was ordered and done and it showed no evidence of blood flow no perfusion to the brain  The patient had a clinical exam which showed no oculocephalic reflex and no ocular vestibular reflex on the cold caloric test   An apnea test was done  The patient was preoxygenated and a baseline arterial blood gases were done  The patient's  baseline pCO2 was 49 8  The ventilator was disconnected and oxygen catheter was passed down the ET tube  Monitoring of the O2 saturation, heart rate, blood pressure and any movement of the patient in attempt to breathe were observed  The patient had no movements observed and she remained hemodynamically stable and O2 saturation remained at % for 10 minutes  After 10 minutes a repeat arterial blood gas is done and the pCO2 was 74 8  The apnea test was positive  The patient was declared brain dead at 16:09  Several attempts have been made to contact the family in [facility] by calling the phone numbers provided however no answers  Will keep trying to call  Critical care time 37 minutes

## 2019-07-29 NOTE — DISCHARGE SUMMARY
Discharge Summary - Tyree Feliz 46 y o  female MRN: [id number]    Unit/Bed#: ICU 01 Encounter: [id number] PCP: No primary care provider on file  Admission Date:   Admission Orders (From admission, onward)     Ordered        07/28/19 0003  Inpatient Admission  Once                     Admitting Diagnosis: Cardiac arrest [facility]) [I46 9]  Respiratory failure ([facility] [address] ) [J96 90]    HPI: 46year old male with a past medical history significant for asthma and arthritis presented to the emergency department after being found down due to cardiac arrest   Patient called EMS complaining of shortness of breath  When EMS arrived was found unresponsive without a pulse, CPR was initiated  EMS was dispatched at 2157 and EMS was able to obtain Ciara Burt 1772 at 2232  She had a pulse on arrival to the emergency department and quickly lost pulse at 2241 and had ROSC at 2306  During this time was intubated and received ACLS protocol with multiple rounds of epinephrine and required defibrillation prior to return  Procedures Performed:   Orders Placed This Encounter   Procedures   [facility] ED ECG Documentation Only       Summary of Hospital Course: She was admitted to the intensive care unit and was placed on hypothermia protocol due to poor neurological examination  She was cooled to 33 degrees celsius  She had to be re-warmed at 2300 due to prolonged QT interval and became hemodynamically unstable  She had an episode of hypertension followed by hypotension needing IV pressors  She remained on Levophed throughout the day  This morning neurologically she had a GCS of 3T with fixed and dilated pupils, no corneal reflex or gag  Due to this had a CT head that showed diffused cerebral edema with loss of gray white matter differentiation  This was concerning for brain death and was ordered a cerebral blood flow study that was consistent with brain death    A cold water chlorics was performed in addition to an apnea test that was consistent with brain death  Patient was pronounced after apnea test        Significant Findings, Care, Treatment and Services Provided:        cerebral blood flow study: no evidence of blood flow nor perfusion to the brain   CT head: diffuse cerebral edema, loss of gray white matter differentiation with diffuse effacement of sulci and basal cisterns concerning for diffuse anoxic injury      Complications:      Disposition:      Final Diagnosis: anoxic brain injury    Resolved Problems  Date Reviewed: 2019    None          Condition at Time of Death:     Date, Time and Cause of Death    Date of Death:  19  Time of Death:   4:09 PM  Preliminary Cause of Death:  Anoxic brain injury ([facility] [address] )  Entered by: Teagan LEMON[TB1 1]     Attribution     TB1  Bhanu Dumont 19 18:06

## 2019-07-29 NOTE — PROCEDURES
Central Line Insertion  Date/Time: 7/29/2019 1:05 PM  Performed by: XOCHILT Lehman  Authorized by: XOCHILT Lehman     Patient location:  Bedside  Consent:     Consent obtained:  Emergent situation  Universal protocol:     Site/side marked: yes      Immediately prior to procedure, a time out was called: yes      Patient identity confirmed:  Arm band and hospital-assigned identification number  Pre-procedure details:     Hand hygiene: Hand hygiene performed prior to insertion      Sterile barrier technique: All elements of maximal sterile technique followed      Skin preparation:  2% chlorhexidine    Skin preparation agent: Skin preparation agent completely dried prior to procedure    Indications:     Central line indications: medications requiring central line    Anesthesia (see MAR for exact dosages): Anesthesia method:  None  Procedure details:     Location:  Right internal jugular    Vessel type: vein      Laterality:  Right    Approach: percutaneous technique used      Patient position:  Flat    Catheter type:  Triple lumen 16cm    Catheter size:  7 Fr    Landmarks identified: yes      Ultrasound guidance: yes      Sterile ultrasound techniques: Sterile gel and sterile probe covers were used      Number of attempts:  1    Vessel of catheter tip end:  16 cm  Post-procedure details:     Post-procedure:  Line sutured and dressing applied    Assessment:  Blood return through all ports    Patient tolerance of procedure:   Tolerated well, no immediate complications    Observer: Yes      Observer name:  Kala Quintana

## 2019-07-29 NOTE — QUICK NOTE
Spoke with boyfriend, Brett, at the bedside regarding brain death exam and was pronounced brain dead at 56  He did not have any family members phone numbers however he was going to attempt to contact a sister via facebook and provider the unit's phone number  He was able to contact a niece who called the unit and I explained her conditions  She was going to contact her 2 sisters and 4 brothers and have them call the unit by tonight

## 2019-07-29 NOTE — PLAN OF CARE
Problem: Prexisting or High Potential for Compromised Skin Integrity  Goal: Skin integrity is maintained or improved  Description  INTERVENTIONS:  - Identify patients at risk for skin breakdown  - Assess and monitor skin integrity  - Assess and monitor nutrition and hydration status  - Monitor labs (i e  albumin)  - Assess for incontinence   - Turn and reposition patient  - Assist with mobility/ambulation  - Relieve pressure over bony prominences  - Avoid friction and shearing  - Provide appropriate hygiene as needed including keeping skin clean and dry  - Evaluate need for skin moisturizer/barrier cream  - Collaborate with interdisciplinary team (i e  Nutrition, Rehabilitation, etc )   - Patient/family teaching  Outcome: Progressing     Problem: NEUROSENSORY - ADULT  Goal: Achieves stable or improved neurological status  Description  INTERVENTIONS  - Monitor and report changes in neurological status  - Initiate measures to prevent increased intracranial pressure  - Maintain blood pressure and fluid volume within ordered parameters to optimize cerebral perfusion  - Monitor temperature, glucose, and sodium or any other associated labs  Initiate appropriate interventions as ordered  - Monitor for seizure activity   - Administer anti-seizure medications as ordered  Outcome: Progressing     Problem: CARDIOVASCULAR - ADULT  Goal: Maintains optimal cardiac output and hemodynamic stability  Description  INTERVENTIONS:  - Monitor I/O, vital signs and rhythm  - Monitor for S/S and trends of decreased cardiac output i e  bleeding, hypotension  - Administer and titrate ordered vasoactive medications to optimize hemodynamic stability  - Assess quality of pulses, skin color and temperature  - Assess for signs of decreased coronary artery perfusion - ex   Angina  - Instruct patient to report change in severity of symptoms  Outcome: Progressing     Problem: RESPIRATORY - ADULT  Goal: Achieves optimal ventilation and oxygenation  Description  INTERVENTIONS:  - Assess for changes in respiratory status  - Assess for changes in mentation and behavior  - Position to facilitate oxygenation and minimize respiratory effort  - Oxygen administration by appropriate delivery method based on oxygen saturation (per order) or ABGs  - Initiate smoking cessation education as indicated  - Encourage broncho-pulmonary hygiene including cough, deep breathe, Incentive Spirometry  - Assess the need for suctioning and aspirate as needed  - Assess and instruct to report SOB or any respiratory difficulty  - Respiratory Therapy support as indicated  Outcome: Progressing     Problem: CARDIOVASCULAR - ADULT  Goal: Absence of cardiac dysrhythmias or at baseline rhythm  Description  INTERVENTIONS:  - Continuous cardiac monitoring, monitor vital signs, obtain 12 lead EKG if indicated  - Administer antiarrhythmic and heart rate control medications as ordered  - Monitor electrolytes and administer replacement therapy as ordered  Outcome: Progressing

## 2019-07-30 ENCOUNTER — APPOINTMENT (INPATIENT)
Dept: NON INVASIVE DIAGNOSTICS | Facility: HOSPITAL | Age: 54
DRG: 208 | End: 2019-07-30
Payer: COMMERCIAL

## 2019-07-30 ENCOUNTER — ANESTHESIA EVENT (OUTPATIENT)
Dept: ANESTHESIOLOGY | Facility: HOSPITAL | Age: 54
End: 2019-07-30

## 2019-07-30 ENCOUNTER — APPOINTMENT (INPATIENT)
Dept: RADIOLOGY | Facility: HOSPITAL | Age: 54
DRG: 208 | End: 2019-07-30
Payer: COMMERCIAL

## 2019-07-30 ENCOUNTER — ANESTHESIA (INPATIENT)
Dept: PERIOP | Facility: HOSPITAL | Age: 54
DRG: 208 | End: 2019-07-30
Payer: COMMERCIAL

## 2019-07-30 ENCOUNTER — ANESTHESIA EVENT (INPATIENT)
Dept: PERIOP | Facility: HOSPITAL | Age: 54
DRG: 208 | End: 2019-07-30
Payer: COMMERCIAL

## 2019-07-30 ENCOUNTER — ANESTHESIA (OUTPATIENT)
Dept: ANESTHESIOLOGY | Facility: HOSPITAL | Age: 54
End: 2019-07-30

## 2019-07-30 VITALS
TEMPERATURE: 97.5 F | BODY MASS INDEX: 27.27 KG/M2 | HEART RATE: 102 BPM | HEIGHT: 60 IN | DIASTOLIC BLOOD PRESSURE: 52 MMHG | OXYGEN SATURATION: 100 % | WEIGHT: 138.89 LBS | RESPIRATION RATE: 20 BRPM | SYSTOLIC BLOOD PRESSURE: 115 MMHG

## 2019-07-30 LAB
ALBUMIN SERPL BCP-MCNC: 1.9 G/DL (ref 3.5–5)
ALBUMIN SERPL BCP-MCNC: 2.1 G/DL (ref 3.5–5)
ALBUMIN SERPL BCP-MCNC: 2.8 G/DL (ref 3.5–5)
ALBUMIN SERPL BCP-MCNC: 3 G/DL (ref 3.5–5)
ALBUMIN SERPL BCP-MCNC: 3 G/DL (ref 3.5–5)
ALP SERPL-CCNC: 39 U/L (ref 46–116)
ALP SERPL-CCNC: 40 U/L (ref 46–116)
ALP SERPL-CCNC: 43 U/L (ref 46–116)
ALP SERPL-CCNC: 49 U/L (ref 46–116)
ALP SERPL-CCNC: 53 U/L (ref 46–116)
ALT SERPL W P-5'-P-CCNC: 112 U/L (ref 12–78)
ALT SERPL W P-5'-P-CCNC: 127 U/L (ref 12–78)
ALT SERPL W P-5'-P-CCNC: 67 U/L (ref 12–78)
ALT SERPL W P-5'-P-CCNC: 75 U/L (ref 12–78)
ALT SERPL W P-5'-P-CCNC: 78 U/L (ref 12–78)
ANION GAP SERPL CALCULATED.3IONS-SCNC: 10 MMOL/L (ref 4–13)
ANION GAP SERPL CALCULATED.3IONS-SCNC: 13 MMOL/L (ref 4–13)
ANION GAP SERPL CALCULATED.3IONS-SCNC: 8 MMOL/L (ref 4–13)
ANION GAP SERPL CALCULATED.3IONS-SCNC: 9 MMOL/L (ref 4–13)
ANION GAP SERPL CALCULATED.3IONS-SCNC: 9 MMOL/L (ref 4–13)
APTT PPP: 32 SECONDS (ref 23–37)
APTT PPP: 32 SECONDS (ref 23–37)
APTT PPP: 40 SECONDS (ref 23–37)
APTT PPP: 41 SECONDS (ref 23–37)
APTT PPP: 47 SECONDS (ref 23–37)
AST SERPL W P-5'-P-CCNC: 106 U/L (ref 5–45)
AST SERPL W P-5'-P-CCNC: 47 U/L (ref 5–45)
AST SERPL W P-5'-P-CCNC: 56 U/L (ref 5–45)
AST SERPL W P-5'-P-CCNC: 60 U/L (ref 5–45)
AST SERPL W P-5'-P-CCNC: 84 U/L (ref 5–45)
B-HCG SERPL-ACNC: <2 MIU/ML
BACTERIA UR QL AUTO: ABNORMAL /HPF
BACTERIA UR QL AUTO: ABNORMAL /HPF
BASE EXCESS BLDA CALC-SCNC: -1 MMOL/L
BASE EXCESS BLDA CALC-SCNC: -2.8 MMOL/L
BASE EXCESS BLDA CALC-SCNC: -3.2 MMOL/L
BASE EXCESS BLDA CALC-SCNC: -4.2 MMOL/L
BASE EXCESS BLDA CALC-SCNC: -5.8 MMOL/L
BASOPHILS # BLD AUTO: 0 THOUSANDS/ΜL (ref 0–0.1)
BASOPHILS # BLD AUTO: 0 THOUSANDS/ΜL (ref 0–0.1)
BASOPHILS # BLD AUTO: 0.01 THOUSANDS/ΜL (ref 0–0.1)
BASOPHILS # BLD AUTO: 0.01 THOUSANDS/ΜL (ref 0–0.1)
BASOPHILS # BLD MANUAL: 0 THOUSAND/UL (ref 0–0.1)
BASOPHILS NFR BLD AUTO: 0 % (ref 0–1)
BASOPHILS NFR MAR MANUAL: 0 % (ref 0–1)
BILIRUB DIRECT SERPL-MCNC: 0.11 MG/DL (ref 0–0.2)
BILIRUB DIRECT SERPL-MCNC: 0.3 MG/DL (ref 0–0.2)
BILIRUB SERPL-MCNC: 0.3 MG/DL (ref 0.2–1)
BILIRUB SERPL-MCNC: 0.44 MG/DL (ref 0.2–1)
BILIRUB SERPL-MCNC: 0.93 MG/DL (ref 0.2–1)
BILIRUB SERPL-MCNC: 0.93 MG/DL (ref 0.2–1)
BILIRUB SERPL-MCNC: 0.96 MG/DL (ref 0.2–1)
BILIRUB UR QL STRIP: NEGATIVE
BILIRUB UR QL STRIP: NEGATIVE
BODY TEMPERATURE: 93.6 DEGREES FEHRENHEIT
BODY TEMPERATURE: 97.5 DEGREES FEHRENHEIT
BODY TEMPERATURE: 99.1 DEGREES FEHRENHEIT
BUN SERPL-MCNC: 13 MG/DL (ref 5–25)
BUN SERPL-MCNC: 14 MG/DL (ref 5–25)
BUN SERPL-MCNC: 16 MG/DL (ref 5–25)
BUN SERPL-MCNC: 17 MG/DL (ref 5–25)
BUN SERPL-MCNC: 18 MG/DL (ref 5–25)
CA-I BLD-SCNC: 1.31 MMOL/L (ref 1.12–1.32)
CALCIUM SERPL-MCNC: 7.6 MG/DL (ref 8.3–10.1)
CALCIUM SERPL-MCNC: 7.8 MG/DL (ref 8.3–10.1)
CALCIUM SERPL-MCNC: 7.9 MG/DL (ref 8.3–10.1)
CALCIUM SERPL-MCNC: 8.2 MG/DL (ref 8.3–10.1)
CALCIUM SERPL-MCNC: 9.5 MG/DL (ref 8.3–10.1)
CHLORIDE SERPL-SCNC: 119 MMOL/L (ref 100–108)
CHLORIDE SERPL-SCNC: 120 MMOL/L (ref 100–108)
CHLORIDE SERPL-SCNC: 124 MMOL/L (ref 100–108)
CHLORIDE SERPL-SCNC: 127 MMOL/L (ref 100–108)
CHLORIDE SERPL-SCNC: 127 MMOL/L (ref 100–108)
CK MB SERPL-MCNC: 1 % (ref 0–2.5)
CK MB SERPL-MCNC: 1.1 % (ref 0–2.5)
CK MB SERPL-MCNC: 10.5 NG/ML (ref 0–5)
CK MB SERPL-MCNC: 17.4 NG/ML (ref 0–5)
CK MB SERPL-MCNC: 22.6 NG/ML (ref 0–5)
CK MB SERPL-MCNC: 6.4 NG/ML (ref 0–5)
CK MB SERPL-MCNC: 8 NG/ML (ref 0–5)
CK MB SERPL-MCNC: <1 % (ref 0–2.5)
CK SERPL-CCNC: 1107 U/L (ref 26–192)
CK SERPL-CCNC: 1592 U/L (ref 26–192)
CK SERPL-CCNC: 2195 U/L (ref 26–192)
CK SERPL-CCNC: 712 U/L (ref 26–192)
CK SERPL-CCNC: 894 U/L (ref 26–192)
CLARITY UR: ABNORMAL
CLARITY UR: CLEAR
CO2 SERPL-SCNC: 22 MMOL/L (ref 21–32)
CO2 SERPL-SCNC: 22 MMOL/L (ref 21–32)
CO2 SERPL-SCNC: 24 MMOL/L (ref 21–32)
CO2 SERPL-SCNC: 25 MMOL/L (ref 21–32)
CO2 SERPL-SCNC: 26 MMOL/L (ref 21–32)
COLOR UR: ABNORMAL
COLOR UR: YELLOW
CREAT SERPL-MCNC: 1.14 MG/DL (ref 0.6–1.3)
CREAT SERPL-MCNC: 1.19 MG/DL (ref 0.6–1.3)
CREAT SERPL-MCNC: 1.21 MG/DL (ref 0.6–1.3)
CREAT SERPL-MCNC: 1.28 MG/DL (ref 0.6–1.3)
CREAT SERPL-MCNC: 1.4 MG/DL (ref 0.6–1.3)
DOHLE BOD BLD QL SMEAR: PRESENT
EOSINOPHIL # BLD AUTO: 0 THOUSAND/ΜL (ref 0–0.61)
EOSINOPHIL # BLD MANUAL: 0.08 THOUSAND/UL (ref 0–0.4)
EOSINOPHIL NFR BLD AUTO: 0 % (ref 0–6)
EOSINOPHIL NFR BLD MANUAL: 1 % (ref 0–6)
ERYTHROCYTE [DISTWIDTH] IN BLOOD BY AUTOMATED COUNT: 14.7 % (ref 11.6–15.1)
ERYTHROCYTE [DISTWIDTH] IN BLOOD BY AUTOMATED COUNT: 14.7 % (ref 11.6–15.1)
ERYTHROCYTE [DISTWIDTH] IN BLOOD BY AUTOMATED COUNT: 14.8 % (ref 11.6–15.1)
ERYTHROCYTE [DISTWIDTH] IN BLOOD BY AUTOMATED COUNT: 14.8 % (ref 11.6–15.1)
ERYTHROCYTE [DISTWIDTH] IN BLOOD BY AUTOMATED COUNT: 14.9 % (ref 11.6–15.1)
EST. AVERAGE GLUCOSE BLD GHB EST-MCNC: 88 MG/DL
GFR SERPL CREATININE-BSD FRML MDRD: 43 ML/MIN/1.73SQ M
GFR SERPL CREATININE-BSD FRML MDRD: 48 ML/MIN/1.73SQ M
GFR SERPL CREATININE-BSD FRML MDRD: 51 ML/MIN/1.73SQ M
GFR SERPL CREATININE-BSD FRML MDRD: 52 ML/MIN/1.73SQ M
GFR SERPL CREATININE-BSD FRML MDRD: 55 ML/MIN/1.73SQ M
GLUCOSE SERPL-MCNC: 128 MG/DL (ref 65–140)
GLUCOSE SERPL-MCNC: 167 MG/DL (ref 65–140)
GLUCOSE SERPL-MCNC: 169 MG/DL (ref 65–140)
GLUCOSE SERPL-MCNC: 176 MG/DL (ref 65–140)
GLUCOSE SERPL-MCNC: 204 MG/DL (ref 65–140)
GLUCOSE UR STRIP-MCNC: ABNORMAL MG/DL
GLUCOSE UR STRIP-MCNC: NEGATIVE MG/DL
HBA1C MFR BLD: 4.7 % (ref 4.2–6.3)
HCO3 BLDA-SCNC: 18.8 MMOL/L (ref 22–28)
HCO3 BLDA-SCNC: 20.3 MMOL/L (ref 22–28)
HCO3 BLDA-SCNC: 22.4 MMOL/L (ref 22–28)
HCO3 BLDA-SCNC: 23.6 MMOL/L (ref 22–28)
HCO3 BLDA-SCNC: 24.1 MMOL/L (ref 22–28)
HCT VFR BLD AUTO: 26.7 % (ref 34.8–46.1)
HCT VFR BLD AUTO: 26.9 % (ref 34.8–46.1)
HCT VFR BLD AUTO: 28.7 % (ref 34.8–46.1)
HCT VFR BLD AUTO: 35 % (ref 34.8–46.1)
HCT VFR BLD AUTO: 37.2 % (ref 34.8–46.1)
HGB BLD-MCNC: 10.1 G/DL (ref 11.5–15.4)
HGB BLD-MCNC: 10.9 G/DL (ref 11.5–15.4)
HGB BLD-MCNC: 7.8 G/DL (ref 11.5–15.4)
HGB BLD-MCNC: 7.9 G/DL (ref 11.5–15.4)
HGB BLD-MCNC: 8.6 G/DL (ref 11.5–15.4)
HGB UR QL STRIP.AUTO: ABNORMAL
HGB UR QL STRIP.AUTO: NEGATIVE
HOROWITZ INDEX BLDA+IHG-RTO: 50 MM[HG]
IMM GRANULOCYTES # BLD AUTO: 0.05 THOUSAND/UL (ref 0–0.2)
IMM GRANULOCYTES # BLD AUTO: 0.08 THOUSAND/UL (ref 0–0.2)
IMM GRANULOCYTES # BLD AUTO: 0.15 THOUSAND/UL (ref 0–0.2)
IMM GRANULOCYTES # BLD AUTO: 0.2 THOUSAND/UL (ref 0–0.2)
IMM GRANULOCYTES NFR BLD AUTO: 1 % (ref 0–2)
IMM GRANULOCYTES NFR BLD AUTO: 2 % (ref 0–2)
INR PPP: 0.96 (ref 0.84–1.19)
INR PPP: 1.06 (ref 0.84–1.19)
INR PPP: 1.12 (ref 0.84–1.19)
INR PPP: 1.13 (ref 0.84–1.19)
INR PPP: 1.13 (ref 0.84–1.19)
KETONES UR STRIP-MCNC: ABNORMAL MG/DL
KETONES UR STRIP-MCNC: NEGATIVE MG/DL
LEUKOCYTE ESTERASE UR QL STRIP: NEGATIVE
LEUKOCYTE ESTERASE UR QL STRIP: NEGATIVE
LIPASE SERPL-CCNC: 101 U/L (ref 73–393)
LIPASE SERPL-CCNC: 131 U/L (ref 73–393)
LIPASE SERPL-CCNC: 154 U/L (ref 73–393)
LIPASE SERPL-CCNC: 154 U/L (ref 73–393)
LIPASE SERPL-CCNC: 78 U/L (ref 73–393)
LYMPHOCYTES # BLD AUTO: 0.22 THOUSANDS/ΜL (ref 0.6–4.47)
LYMPHOCYTES # BLD AUTO: 0.24 THOUSANDS/ΜL (ref 0.6–4.47)
LYMPHOCYTES # BLD AUTO: 0.25 THOUSANDS/ΜL (ref 0.6–4.47)
LYMPHOCYTES # BLD AUTO: 0.32 THOUSAND/UL (ref 0.6–4.47)
LYMPHOCYTES # BLD AUTO: 0.4 THOUSANDS/ΜL (ref 0.6–4.47)
LYMPHOCYTES # BLD AUTO: 4 % (ref 14–44)
LYMPHOCYTES NFR BLD AUTO: 2 % (ref 14–44)
LYMPHOCYTES NFR BLD AUTO: 2 % (ref 14–44)
LYMPHOCYTES NFR BLD AUTO: 4 % (ref 14–44)
LYMPHOCYTES NFR BLD AUTO: 5 % (ref 14–44)
MCH RBC QN AUTO: 22.1 PG (ref 26.8–34.3)
MCH RBC QN AUTO: 22.2 PG (ref 26.8–34.3)
MCH RBC QN AUTO: 22.2 PG (ref 26.8–34.3)
MCH RBC QN AUTO: 22.4 PG (ref 26.8–34.3)
MCH RBC QN AUTO: 23.1 PG (ref 26.8–34.3)
MCHC RBC AUTO-ENTMCNC: 28.9 G/DL (ref 31.4–37.4)
MCHC RBC AUTO-ENTMCNC: 29.2 G/DL (ref 31.4–37.4)
MCHC RBC AUTO-ENTMCNC: 29.3 G/DL (ref 31.4–37.4)
MCHC RBC AUTO-ENTMCNC: 29.4 G/DL (ref 31.4–37.4)
MCHC RBC AUTO-ENTMCNC: 30 G/DL (ref 31.4–37.4)
MCV RBC AUTO: 75 FL (ref 82–98)
MCV RBC AUTO: 76 FL (ref 82–98)
MCV RBC AUTO: 76 FL (ref 82–98)
MCV RBC AUTO: 77 FL (ref 82–98)
MCV RBC AUTO: 77 FL (ref 82–98)
MONOCYTES # BLD AUTO: 0.24 THOUSAND/UL (ref 0–1.22)
MONOCYTES # BLD AUTO: 0.48 THOUSAND/ΜL (ref 0.17–1.22)
MONOCYTES # BLD AUTO: 0.5 THOUSAND/ΜL (ref 0.17–1.22)
MONOCYTES # BLD AUTO: 0.87 THOUSAND/ΜL (ref 0.17–1.22)
MONOCYTES # BLD AUTO: 1.21 THOUSAND/ΜL (ref 0.17–1.22)
MONOCYTES NFR BLD AUTO: 6 % (ref 4–12)
MONOCYTES NFR BLD AUTO: 7 % (ref 4–12)
MONOCYTES NFR BLD AUTO: 7 % (ref 4–12)
MONOCYTES NFR BLD AUTO: 8 % (ref 4–12)
MONOCYTES NFR BLD: 3 % (ref 4–12)
NEUTROPHILS # BLD AUTO: 11.27 THOUSANDS/ΜL (ref 1.85–7.62)
NEUTROPHILS # BLD AUTO: 13.57 THOUSANDS/ΜL (ref 1.85–7.62)
NEUTROPHILS # BLD AUTO: 6.37 THOUSANDS/ΜL (ref 1.85–7.62)
NEUTROPHILS # BLD AUTO: 6.81 THOUSANDS/ΜL (ref 1.85–7.62)
NEUTROPHILS # BLD MANUAL: 7.34 THOUSAND/UL (ref 1.85–7.62)
NEUTS BAND NFR BLD MANUAL: 13 % (ref 0–8)
NEUTS SEG NFR BLD AUTO: 79 % (ref 43–75)
NEUTS SEG NFR BLD AUTO: 88 % (ref 43–75)
NEUTS SEG NFR BLD AUTO: 88 % (ref 43–75)
NEUTS SEG NFR BLD AUTO: 89 % (ref 43–75)
NEUTS SEG NFR BLD AUTO: 89 % (ref 43–75)
NITRITE UR QL STRIP: NEGATIVE
NITRITE UR QL STRIP: NEGATIVE
NON-SQ EPI CELLS URNS QL MICRO: ABNORMAL /HPF
NON-SQ EPI CELLS URNS QL MICRO: ABNORMAL /HPF
NRBC BLD AUTO-RTO: 0 /100 WBCS
O2 CT BLDA-SCNC: 11.6 ML/DL (ref 16–23)
O2 CT BLDA-SCNC: 11.8 ML/DL (ref 16–23)
O2 CT BLDA-SCNC: 12.9 ML/DL (ref 16–23)
O2 CT BLDA-SCNC: 14.8 ML/DL (ref 16–23)
O2 CT BLDA-SCNC: 15.6 ML/DL (ref 16–23)
OXYHGB MFR BLDA: 94.8 % (ref 94–97)
OXYHGB MFR BLDA: 95.6 % (ref 94–97)
OXYHGB MFR BLDA: 97.2 % (ref 94–97)
OXYHGB MFR BLDA: 97.4 % (ref 94–97)
OXYHGB MFR BLDA: 97.5 % (ref 94–97)
PCO2 BLDA: 33.8 MM HG (ref 36–44)
PCO2 BLDA: 34.3 MM HG (ref 36–44)
PCO2 BLDA: 40.7 MM HG (ref 36–44)
PCO2 BLDA: 41.8 MM HG (ref 36–44)
PCO2 BLDA: 50.4 MM HG (ref 36–44)
PCO2 TEMP ADJ BLDA: 34.8 MM HG (ref 36–44)
PCO2 TEMP ADJ BLDA: 37 MM HG (ref 36–44)
PCO2 TEMP ADJ BLDA: 39.6 MM HG (ref 36–44)
PEEP RESPIRATORY: 5 CM[H2O]
PH BLD: 7.37 [PH] (ref 7.35–7.45)
PH BLD: 7.38 [PH] (ref 7.35–7.45)
PH BLD: 7.42 [PH] (ref 7.35–7.45)
PH BLDA: 7.29 [PH] (ref 7.35–7.45)
PH BLDA: 7.36 [PH] (ref 7.35–7.45)
PH BLDA: 7.36 [PH] (ref 7.35–7.45)
PH BLDA: 7.38 [PH] (ref 7.35–7.45)
PH BLDA: 7.39 [PH] (ref 7.35–7.45)
PH UR STRIP.AUTO: 5.5 [PH]
PH UR STRIP.AUTO: 8.5 [PH]
PLATELET # BLD AUTO: 112 THOUSANDS/UL (ref 149–390)
PLATELET # BLD AUTO: 115 THOUSANDS/UL (ref 149–390)
PLATELET # BLD AUTO: 120 THOUSANDS/UL (ref 149–390)
PLATELET # BLD AUTO: 190 THOUSANDS/UL (ref 149–390)
PLATELET # BLD AUTO: 218 THOUSANDS/UL (ref 149–390)
PLATELET BLD QL SMEAR: ABNORMAL
PMV BLD AUTO: 11.3 FL (ref 8.9–12.7)
PMV BLD AUTO: 11.4 FL (ref 8.9–12.7)
PMV BLD AUTO: 12 FL (ref 8.9–12.7)
PMV BLD AUTO: 12 FL (ref 8.9–12.7)
PMV BLD AUTO: 12.1 FL (ref 8.9–12.7)
PO2 BLD: 126.2 MM HG (ref 75–129)
PO2 BLD: 139.8 MM HG (ref 75–129)
PO2 BLD: 80.4 MM HG (ref 75–129)
PO2 BLDA: 108.8 MM HG (ref 75–129)
PO2 BLDA: 129.9 MM HG (ref 75–129)
PO2 BLDA: 135.1 MM HG (ref 75–129)
PO2 BLDA: 137.9 MM HG (ref 75–129)
PO2 BLDA: 95.5 MM HG (ref 75–129)
POTASSIUM SERPL-SCNC: 3.2 MMOL/L (ref 3.5–5.3)
POTASSIUM SERPL-SCNC: 3.5 MMOL/L (ref 3.5–5.3)
POTASSIUM SERPL-SCNC: 3.8 MMOL/L (ref 3.5–5.3)
POTASSIUM SERPL-SCNC: 3.8 MMOL/L (ref 3.5–5.3)
POTASSIUM SERPL-SCNC: 4.2 MMOL/L (ref 3.5–5.3)
PROT SERPL-MCNC: 4.9 G/DL (ref 6.4–8.2)
PROT SERPL-MCNC: 5.2 G/DL (ref 6.4–8.2)
PROT SERPL-MCNC: 5.2 G/DL (ref 6.4–8.2)
PROT SERPL-MCNC: 5.3 G/DL (ref 6.4–8.2)
PROT SERPL-MCNC: 5.3 G/DL (ref 6.4–8.2)
PROT UR STRIP-MCNC: ABNORMAL MG/DL
PROT UR STRIP-MCNC: NEGATIVE MG/DL
PROTHROMBIN TIME: 12.9 SECONDS (ref 11.6–14.5)
PROTHROMBIN TIME: 13.9 SECONDS (ref 11.6–14.5)
PROTHROMBIN TIME: 14.6 SECONDS (ref 11.6–14.5)
PROTHROMBIN TIME: 14.7 SECONDS (ref 11.6–14.5)
PROTHROMBIN TIME: 14.7 SECONDS (ref 11.6–14.5)
RBC # BLD AUTO: 3.52 MILLION/UL (ref 3.81–5.12)
RBC # BLD AUTO: 3.52 MILLION/UL (ref 3.81–5.12)
RBC # BLD AUTO: 3.73 MILLION/UL (ref 3.81–5.12)
RBC # BLD AUTO: 4.55 MILLION/UL (ref 3.81–5.12)
RBC # BLD AUTO: 4.94 MILLION/UL (ref 3.81–5.12)
RBC #/AREA URNS AUTO: ABNORMAL /HPF
RBC #/AREA URNS AUTO: ABNORMAL /HPF
RBC MORPH BLD: NORMAL
SODIUM SERPL-SCNC: 152 MMOL/L (ref 136–145)
SODIUM SERPL-SCNC: 154 MMOL/L (ref 136–145)
SODIUM SERPL-SCNC: 157 MMOL/L (ref 136–145)
SODIUM SERPL-SCNC: 161 MMOL/L (ref 136–145)
SODIUM SERPL-SCNC: 161 MMOL/L (ref 136–145)
SP GR UR STRIP.AUTO: 1.01 (ref 1–1.03)
SP GR UR STRIP.AUTO: <=1.005 (ref 1–1.03)
SPECIMEN SOURCE: ABNORMAL
TOTAL CELLS COUNTED SPEC: 100
TROPONIN I SERPL-MCNC: 0.21 NG/ML
TROPONIN I SERPL-MCNC: 0.26 NG/ML
TROPONIN I SERPL-MCNC: 0.27 NG/ML
TROPONIN I SERPL-MCNC: 0.31 NG/ML
UROBILINOGEN UR QL STRIP.AUTO: 0.2 E.U./DL
UROBILINOGEN UR QL STRIP.AUTO: 0.2 E.U./DL
VENT AC: 18
VENT AC: 18
VENT AC: 21
VENT- AC: AC
VT SETTING VENT: 380 ML
WBC # BLD AUTO: 12.59 THOUSAND/UL (ref 4.31–10.16)
WBC # BLD AUTO: 15.16 THOUSAND/UL (ref 4.31–10.16)
WBC # BLD AUTO: 7.18 THOUSAND/UL (ref 4.31–10.16)
WBC # BLD AUTO: 7.76 THOUSAND/UL (ref 4.31–10.16)
WBC # BLD AUTO: 7.98 THOUSAND/UL (ref 4.31–10.16)
WBC #/AREA URNS AUTO: ABNORMAL /HPF
WBC #/AREA URNS AUTO: ABNORMAL /HPF

## 2019-07-30 PROCEDURE — 82805 BLOOD GASES W/O2 SATURATION: CPT | Performed by: INTERNAL MEDICINE

## 2019-07-30 PROCEDURE — 82330 ASSAY OF CALCIUM: CPT | Performed by: INTERNAL MEDICINE

## 2019-07-30 PROCEDURE — 85027 COMPLETE CBC AUTOMATED: CPT | Performed by: INTERNAL MEDICINE

## 2019-07-30 PROCEDURE — P9016 RBC LEUKOCYTES REDUCED: HCPCS | Performed by: NURSE ANESTHETIST, CERTIFIED REGISTERED

## 2019-07-30 PROCEDURE — 85610 PROTHROMBIN TIME: CPT | Performed by: INTERNAL MEDICINE

## 2019-07-30 PROCEDURE — 94640 AIRWAY INHALATION TREATMENT: CPT

## 2019-07-30 PROCEDURE — 93306 TTE W/DOPPLER COMPLETE: CPT

## 2019-07-30 PROCEDURE — 85025 COMPLETE CBC W/AUTO DIFF WBC: CPT | Performed by: INTERNAL MEDICINE

## 2019-07-30 PROCEDURE — 82805 BLOOD GASES W/O2 SATURATION: CPT | Performed by: NURSE PRACTITIONER

## 2019-07-30 PROCEDURE — 94003 VENT MGMT INPAT SUBQ DAY: CPT

## 2019-07-30 PROCEDURE — 83690 ASSAY OF LIPASE: CPT | Performed by: NURSE PRACTITIONER

## 2019-07-30 PROCEDURE — 81001 URINALYSIS AUTO W/SCOPE: CPT | Performed by: NURSE PRACTITIONER

## 2019-07-30 PROCEDURE — 83690 ASSAY OF LIPASE: CPT | Performed by: INTERNAL MEDICINE

## 2019-07-30 PROCEDURE — C1760 CLOSURE DEV, VASC: HCPCS | Performed by: PHYSICIAN ASSISTANT

## 2019-07-30 PROCEDURE — 85007 BL SMEAR W/DIFF WBC COUNT: CPT | Performed by: INTERNAL MEDICINE

## 2019-07-30 PROCEDURE — 85610 PROTHROMBIN TIME: CPT | Performed by: NURSE PRACTITIONER

## 2019-07-30 PROCEDURE — 84484 ASSAY OF TROPONIN QUANT: CPT | Performed by: INTERNAL MEDICINE

## 2019-07-30 PROCEDURE — 86920 COMPATIBILITY TEST SPIN: CPT | Performed by: NURSE ANESTHETIST, CERTIFIED REGISTERED

## 2019-07-30 PROCEDURE — C1894 INTRO/SHEATH, NON-LASER: HCPCS | Performed by: PHYSICIAN ASSISTANT

## 2019-07-30 PROCEDURE — 84484 ASSAY OF TROPONIN QUANT: CPT | Performed by: NURSE PRACTITIONER

## 2019-07-30 PROCEDURE — 71045 X-RAY EXAM CHEST 1 VIEW: CPT

## 2019-07-30 PROCEDURE — 82550 ASSAY OF CK (CPK): CPT | Performed by: NURSE PRACTITIONER

## 2019-07-30 PROCEDURE — 93005 ELECTROCARDIOGRAM TRACING: CPT

## 2019-07-30 PROCEDURE — 82248 BILIRUBIN DIRECT: CPT | Performed by: INTERNAL MEDICINE

## 2019-07-30 PROCEDURE — 85025 COMPLETE CBC W/AUTO DIFF WBC: CPT | Performed by: NURSE PRACTITIONER

## 2019-07-30 PROCEDURE — P9016 RBC LEUKOCYTES REDUCED: HCPCS

## 2019-07-30 PROCEDURE — 84702 CHORIONIC GONADOTROPIN TEST: CPT | Performed by: NURSE PRACTITIONER

## 2019-07-30 PROCEDURE — 82550 ASSAY OF CK (CPK): CPT | Performed by: INTERNAL MEDICINE

## 2019-07-30 PROCEDURE — 82553 CREATINE MB FRACTION: CPT | Performed by: NURSE PRACTITIONER

## 2019-07-30 PROCEDURE — 83036 HEMOGLOBIN GLYCOSYLATED A1C: CPT | Performed by: NURSE PRACTITIONER

## 2019-07-30 PROCEDURE — 85730 THROMBOPLASTIN TIME PARTIAL: CPT | Performed by: NURSE PRACTITIONER

## 2019-07-30 PROCEDURE — 82553 CREATINE MB FRACTION: CPT | Performed by: INTERNAL MEDICINE

## 2019-07-30 PROCEDURE — 93458 L HRT ARTERY/VENTRICLE ANGIO: CPT | Performed by: INTERNAL MEDICINE

## 2019-07-30 PROCEDURE — 85730 THROMBOPLASTIN TIME PARTIAL: CPT | Performed by: INTERNAL MEDICINE

## 2019-07-30 PROCEDURE — C1769 GUIDE WIRE: HCPCS | Performed by: PHYSICIAN ASSISTANT

## 2019-07-30 PROCEDURE — 93458 L HRT ARTERY/VENTRICLE ANGIO: CPT | Performed by: PHYSICIAN ASSISTANT

## 2019-07-30 PROCEDURE — 80053 COMPREHEN METABOLIC PANEL: CPT | Performed by: INTERNAL MEDICINE

## 2019-07-30 RX ORDER — ALBUMIN, HUMAN INJ 5% 5 %
25 SOLUTION INTRAVENOUS ONCE
Status: COMPLETED | OUTPATIENT
Start: 2019-07-30 | End: 2019-07-30

## 2019-07-30 RX ORDER — CEFAZOLIN SODIUM 1 G/3ML
1000 INJECTION, POWDER, FOR SOLUTION INTRAMUSCULAR; INTRAVENOUS ONCE
Status: DISCONTINUED | OUTPATIENT
Start: 2019-07-30 | End: 2019-07-31 | Stop reason: HOSPADM

## 2019-07-30 RX ORDER — POTASSIUM CHLORIDE 29.8 MG/ML
40 INJECTION INTRAVENOUS ONCE
Status: COMPLETED | OUTPATIENT
Start: 2019-07-30 | End: 2019-07-30

## 2019-07-30 RX ORDER — DEXTROSE MONOHYDRATE 25 G/50ML
50 INJECTION, SOLUTION INTRAVENOUS ONCE
Status: COMPLETED | OUTPATIENT
Start: 2019-07-30 | End: 2019-07-30

## 2019-07-30 RX ORDER — MANNITOL 250 MG/ML
INJECTION, SOLUTION INTRAVENOUS AS NEEDED
Status: DISCONTINUED | OUTPATIENT
Start: 2019-07-30 | End: 2019-07-31 | Stop reason: SURG

## 2019-07-30 RX ORDER — AMPHOTERICIN B 50 MG/10ML
50 INJECTION, POWDER, LYOPHILIZED, FOR SOLUTION INTRAVENOUS ONCE
Status: DISCONTINUED | OUTPATIENT
Start: 2019-07-30 | End: 2019-07-31 | Stop reason: HOSPADM

## 2019-07-30 RX ORDER — VASOPRESSIN 20 U/ML
1 INJECTION PARENTERAL ONCE
Status: DISCONTINUED | OUTPATIENT
Start: 2019-07-30 | End: 2019-07-30

## 2019-07-30 RX ORDER — SODIUM CHLORIDE 9 MG/ML
INJECTION, SOLUTION INTRAVENOUS CONTINUOUS PRN
Status: DISCONTINUED | OUTPATIENT
Start: 2019-07-30 | End: 2019-07-31 | Stop reason: SURG

## 2019-07-30 RX ORDER — CEFAZOLIN SODIUM 1 G/50ML
1000 SOLUTION INTRAVENOUS EVERY 8 HOURS
Status: DISCONTINUED | OUTPATIENT
Start: 2019-07-30 | End: 2019-07-31 | Stop reason: HOSPADM

## 2019-07-30 RX ORDER — METHYLPREDNISOLONE SODIUM SUCCINATE 1 G/16ML
2000 INJECTION, POWDER, LYOPHILIZED, FOR SOLUTION INTRAMUSCULAR; INTRAVENOUS ONCE
Status: DISCONTINUED | OUTPATIENT
Start: 2019-07-30 | End: 2019-07-30

## 2019-07-30 RX ORDER — ROCURONIUM BROMIDE 10 MG/ML
INJECTION, SOLUTION INTRAVENOUS AS NEEDED
Status: DISCONTINUED | OUTPATIENT
Start: 2019-07-30 | End: 2019-07-31 | Stop reason: SURG

## 2019-07-30 RX ORDER — FUROSEMIDE 10 MG/ML
20 INJECTION INTRAMUSCULAR; INTRAVENOUS ONCE
Status: COMPLETED | OUTPATIENT
Start: 2019-07-30 | End: 2019-07-30

## 2019-07-30 RX ORDER — FUROSEMIDE 10 MG/ML
INJECTION INTRAMUSCULAR; INTRAVENOUS AS NEEDED
Status: DISCONTINUED | OUTPATIENT
Start: 2019-07-30 | End: 2019-07-31 | Stop reason: SURG

## 2019-07-30 RX ORDER — LEVOTHYROXINE SODIUM ANHYDROUS 100 UG/5ML
20 INJECTION, POWDER, LYOPHILIZED, FOR SOLUTION INTRAVENOUS ONCE
Status: DISCONTINUED | OUTPATIENT
Start: 2019-07-30 | End: 2019-07-31 | Stop reason: HOSPADM

## 2019-07-30 RX ORDER — POTASSIUM CHLORIDE 14.9 MG/ML
20 INJECTION INTRAVENOUS ONCE
Status: COMPLETED | OUTPATIENT
Start: 2019-07-30 | End: 2019-07-30

## 2019-07-30 RX ADMIN — SODIUM CHLORIDE 1000 MG: 0.9 INJECTION, SOLUTION INTRAVENOUS at 08:07

## 2019-07-30 RX ADMIN — SODIUM CHLORIDE: 0.9 INJECTION, SOLUTION INTRAVENOUS at 22:37

## 2019-07-30 RX ADMIN — PHENYLEPHRINE HYDROCHLORIDE 25 MCG/MIN: 10 INJECTION INTRAVENOUS at 05:37

## 2019-07-30 RX ADMIN — SODIUM CHLORIDE 75 ML/HR: 234 INJECTION INTRAMUSCULAR; INTRAVENOUS; SUBCUTANEOUS at 02:25

## 2019-07-30 RX ADMIN — LEVOTHYROXINE SODIUM ANHYDROUS 40 MCG/HR: 100 INJECTION, POWDER, LYOPHILIZED, FOR SOLUTION INTRAVENOUS at 09:18

## 2019-07-30 RX ADMIN — SODIUM CHLORIDE: 0.9 INJECTION, SOLUTION INTRAVENOUS at 23:48

## 2019-07-30 RX ADMIN — ALBUMIN (HUMAN) 25 G: 12.5 SOLUTION INTRAVENOUS at 05:59

## 2019-07-30 RX ADMIN — LEVOTHYROXINE SODIUM ANHYDROUS 40 MCG/HR: 100 INJECTION, POWDER, LYOPHILIZED, FOR SOLUTION INTRAVENOUS at 14:47

## 2019-07-30 RX ADMIN — LEVALBUTEROL 1.25 MG: 1.25 SOLUTION, CONCENTRATE RESPIRATORY (INHALATION) at 07:22

## 2019-07-30 RX ADMIN — POTASSIUM CHLORIDE 40 MEQ: 400 INJECTION, SOLUTION INTRAVENOUS at 17:45

## 2019-07-30 RX ADMIN — LEVOTHYROXINE SODIUM ANHYDROUS 40 MCG/HR: 100 INJECTION, POWDER, LYOPHILIZED, FOR SOLUTION INTRAVENOUS at 19:39

## 2019-07-30 RX ADMIN — CEFAZOLIN SODIUM 1000 MG: 1 SOLUTION INTRAVENOUS at 09:32

## 2019-07-30 RX ADMIN — POTASSIUM CHLORIDE 40 MEQ: 400 INJECTION, SOLUTION INTRAVENOUS at 08:08

## 2019-07-30 RX ADMIN — CALCIUM CHLORIDE 1 G: 100 INJECTION INTRAVENOUS; INTRAVENTRICULAR at 19:28

## 2019-07-30 RX ADMIN — METHYLPREDNISOLONE SODIUM SUCCINATE 60 MG: 125 INJECTION, POWDER, FOR SOLUTION INTRAMUSCULAR; INTRAVENOUS at 00:02

## 2019-07-30 RX ADMIN — IPRATROPIUM BROMIDE 0.5 MG: 0.5 SOLUTION RESPIRATORY (INHALATION) at 00:56

## 2019-07-30 RX ADMIN — SODIUM CHLORIDE 100 ML/HR: 0.45 INJECTION, SOLUTION INTRAVENOUS at 00:00

## 2019-07-30 RX ADMIN — HEPARIN SODIUM 5000 UNITS: 5000 INJECTION INTRAVENOUS; SUBCUTANEOUS at 13:33

## 2019-07-30 RX ADMIN — SODIUM CHLORIDE 1000 MG: 0.9 INJECTION, SOLUTION INTRAVENOUS at 20:29

## 2019-07-30 RX ADMIN — IPRATROPIUM BROMIDE 0.5 MG: 0.5 SOLUTION RESPIRATORY (INHALATION) at 12:59

## 2019-07-30 RX ADMIN — ALBUMIN (HUMAN) 25 G: 12.5 SOLUTION INTRAVENOUS at 08:58

## 2019-07-30 RX ADMIN — HEPARIN SODIUM 5000 UNITS: 5000 INJECTION INTRAVENOUS; SUBCUTANEOUS at 05:46

## 2019-07-30 RX ADMIN — CEFAZOLIN SODIUM 1000 MG: 1 SOLUTION INTRAVENOUS at 17:46

## 2019-07-30 RX ADMIN — FAMOTIDINE 20 MG: 40 POWDER, FOR SUSPENSION ORAL at 08:08

## 2019-07-30 RX ADMIN — LEVALBUTEROL 1.25 MG: 1.25 SOLUTION, CONCENTRATE RESPIRATORY (INHALATION) at 12:59

## 2019-07-30 RX ADMIN — DEXTROSE 50 % IN WATER (D50W) INTRAVENOUS SYRINGE 50 ML: at 03:29

## 2019-07-30 RX ADMIN — CEFAZOLIN SODIUM 1000 MG: 1 SOLUTION INTRAVENOUS at 02:17

## 2019-07-30 RX ADMIN — LEVOTHYROXINE SODIUM ANHYDROUS: 100 INJECTION, POWDER, LYOPHILIZED, FOR SOLUTION INTRAVENOUS at 02:37

## 2019-07-30 RX ADMIN — FUROSEMIDE 20 MG: 10 INJECTION, SOLUTION INTRAMUSCULAR; INTRAVENOUS at 13:33

## 2019-07-30 RX ADMIN — CHLORHEXIDINE GLUCONATE 0.12% ORAL RINSE 15 ML: 1.2 LIQUID ORAL at 08:07

## 2019-07-30 RX ADMIN — SODIUM CHLORIDE 200 ML/HR: 234 INJECTION INTRAMUSCULAR; INTRAVENOUS; SUBCUTANEOUS at 18:35

## 2019-07-30 RX ADMIN — IODIXANOL 55 ML: 320 INJECTION, SOLUTION INTRAVASCULAR at 10:52

## 2019-07-30 RX ADMIN — ROCURONIUM BROMIDE 100 MG: 100 INJECTION, SOLUTION INTRAVENOUS at 22:50

## 2019-07-30 RX ADMIN — VASOPRESSIN: 20 INJECTION INTRAVENOUS at 03:32

## 2019-07-30 RX ADMIN — SODIUM CHLORIDE: 0.9 INJECTION, SOLUTION INTRAVENOUS at 23:05

## 2019-07-30 RX ADMIN — IPRATROPIUM BROMIDE 0.5 MG: 0.5 SOLUTION RESPIRATORY (INHALATION) at 07:21

## 2019-07-30 RX ADMIN — MANNITOL 50 G: 250 INJECTION, SOLUTION INTRAVENOUS at 23:23

## 2019-07-30 RX ADMIN — CALCIUM CHLORIDE 1 G: 100 INJECTION INTRAVENOUS; INTRAVENTRICULAR at 18:33

## 2019-07-30 RX ADMIN — SODIUM CHLORIDE: 0.9 INJECTION, SOLUTION INTRAVENOUS at 22:11

## 2019-07-30 RX ADMIN — LEVALBUTEROL 1.25 MG: 1.25 SOLUTION, CONCENTRATE RESPIRATORY (INHALATION) at 00:56

## 2019-07-30 RX ADMIN — POTASSIUM CHLORIDE 20 MEQ: 200 INJECTION, SOLUTION INTRAVENOUS at 13:32

## 2019-07-30 RX ADMIN — VASOPRESSIN 0.03 UNITS/MIN: 20 INJECTION INTRAVENOUS at 04:59

## 2019-07-30 RX ADMIN — SODIUM CHLORIDE 1000 MG: 0.9 INJECTION, SOLUTION INTRAVENOUS at 14:04

## 2019-07-30 RX ADMIN — SODIUM CHLORIDE 130 ML/HR: 234 INJECTION INTRAMUSCULAR; INTRAVENOUS; SUBCUTANEOUS at 12:54

## 2019-07-30 RX ADMIN — FUROSEMIDE 100 MG: 10 INJECTION, SOLUTION INTRAMUSCULAR; INTRAVENOUS at 23:19

## 2019-07-30 RX ADMIN — SODIUM CHLORIDE: 9 INJECTION, SOLUTION INTRAVENOUS at 03:31

## 2019-07-30 RX ADMIN — SODIUM CHLORIDE 2000 MG: 0.9 INJECTION, SOLUTION INTRAVENOUS at 02:38

## 2019-07-30 RX ADMIN — LEVOTHYROXINE SODIUM ANHYDROUS 20 MCG/HR: 100 INJECTION, POWDER, LYOPHILIZED, FOR SOLUTION INTRAVENOUS at 03:34

## 2019-07-30 NOTE — PLAN OF CARE
Problem: Prexisting or High Potential for Compromised Skin Integrity  Goal: Skin integrity is maintained or improved  Description  INTERVENTIONS:  - Identify patients at risk for skin breakdown  - Assess and monitor skin integrity  - Assess and monitor nutrition and hydration status  - Monitor labs (i e  albumin)  - Assess for incontinence   - Turn and reposition patient  - Assist with mobility/ambulation  - Relieve pressure over bony prominences  - Avoid friction and shearing  - Provide appropriate hygiene as needed including keeping skin clean and dry  - Evaluate need for skin moisturizer/barrier cream  - Collaborate with interdisciplinary team (i e  Nutrition, Rehabilitation, etc )   - Patient/family teaching  Outcome: Progressing     Problem: CARDIOVASCULAR - ADULT  Goal: Maintains optimal cardiac output and hemodynamic stability  Description  INTERVENTIONS:  - Monitor I/O, vital signs and rhythm  - Monitor for S/S and trends of decreased cardiac output i e  bleeding, hypotension  - Administer and titrate ordered vasoactive medications to optimize hemodynamic stability  - Assess quality of pulses, skin color and temperature  - Assess for signs of decreased coronary artery perfusion - ex   Angina  - Instruct patient to report change in severity of symptoms  Outcome: Progressing  Goal: Absence of cardiac dysrhythmias or at baseline rhythm  Description  INTERVENTIONS:  - Continuous cardiac monitoring, monitor vital signs, obtain 12 lead EKG if indicated  - Administer antiarrhythmic and heart rate control medications as ordered  - Monitor electrolytes and administer replacement therapy as ordered  Outcome: Progressing     Problem: METABOLIC, FLUID AND ELECTROLYTES - ADULT  Goal: Electrolytes maintained within normal limits  Description  INTERVENTIONS:  - Monitor labs and assess patient for signs and symptoms of electrolyte imbalances  - Administer electrolyte replacement as ordered  - Monitor response to electrolyte replacements, including repeat lab results as appropriate  - Instruct patient on fluid and nutrition as appropriate  Outcome: Progressing  Goal: Glucose maintained within target range  Description  INTERVENTIONS:  - Monitor Blood Glucose as ordered  - Assess for signs and symptoms of hyperglycemia and hypoglycemia  - Administer ordered medications to maintain glucose within target range  - Assess nutritional intake and initiate nutrition service referral as needed  Outcome: Progressing     Problem: SKIN/TISSUE INTEGRITY - ADULT  Goal: Skin integrity remains intact  Description  INTERVENTIONS  - Identify patients at risk for skin breakdown  - Assess and monitor skin integrity  - Assess and monitor nutrition and hydration status  - Monitor labs (i e  albumin)  - Assess for incontinence   - Turn and reposition patient  - Assist with mobility/ambulation  - Relieve pressure over bony prominences  - Avoid friction and shearing  - Provide appropriate hygiene as needed including keeping skin clean and dry  - Evaluate need for skin moisturizer/barrier cream  - Collaborate with interdisciplinary team (i e  Nutrition, Rehabilitation, etc )   - Patient/family teaching  Outcome: Progressing     Problem: DEATH & DYING  Goal: Pt/Family communicate acceptance of impending death and expresses psychological comfort and peace  Description  INTERVENTIONS:  - Assess patient/family anxiety and grief process related to end of life issues  - Provide emotional, spiritual and psychosocial support  - Provide information about the patient's health status with consideration of family and cultural values  - Communicate willingness to discuss death and facilitate grief process  with patient/family as appropriate  - Emphasize sustaining relationships within family system and community, or adela/spiritual traditions  - Initiate Spiritual Care, Pastoral care or other ancillary consults as needed  - Refer to community support groups as appropriate   Outcome: Progressing     Problem: SPIRITUAL CARE  Goal: Pt/Family able to move forward in process of forgiving self, others and/or higher power  Description  INTERVENTIONS:  - Assist patient with any spiritual needs/requests such as communion, confession, anointing, etc  - Explore guilt and help patient/family identify possible spiritual/cultural beliefs and values  - Explore possibilities of making amends & reconciliation with self, others, and/or a greater power  - Guide patient/family in identifying painful feelings  - Help patient explore and identify spiritual beliefs, cultural understandings or values that may help or hinder letting go of issue  - Help patient explore feelings of anger, bitterness, resentment, anxiety   Help patient/family identify and examine the situation in which these feelings are experienced  - Help patient/family identify destructive displacement of feelings onto other individuals  - Refer patient to formal counseling and/or to adela community for further support as needed or per request  Outcome: Progressing

## 2019-07-31 LAB
ABO GROUP BLD BPU: NORMAL
ATRIAL RATE: 109 BPM
ATRIAL RATE: 150 BPM
BPU ID: NORMAL
CROSSMATCH: NORMAL
P AXIS: 128 DEGREES
PR INTERVAL: 246 MS
QRS AXIS: 60 DEGREES
QRS AXIS: 61 DEGREES
QRSD INTERVAL: 71 MS
QRSD INTERVAL: 79 MS
QT INTERVAL: 258 MS
QT INTERVAL: 433 MS
QTC INTERVAL: 408 MS
QTC INTERVAL: 584 MS
T WAVE AXIS: 224 DEGREES
T WAVE AXIS: 233 DEGREES
UNIT DISPENSE STATUS: NORMAL
UNIT PRODUCT CODE: NORMAL
UNIT RH: NORMAL
VENTRICULAR RATE: 109 BPM
VENTRICULAR RATE: 150 BPM

## 2019-07-31 RX ORDER — HEPARIN SODIUM 1000 [USP'U]/ML
INJECTION, SOLUTION INTRAVENOUS; SUBCUTANEOUS AS NEEDED
Status: DISCONTINUED | OUTPATIENT
Start: 2019-07-31 | End: 2019-07-31 | Stop reason: SURG

## 2019-07-31 RX ADMIN — HEPARIN SODIUM 25000 UNITS: 1000 INJECTION INTRAVENOUS; SUBCUTANEOUS at 00:05

## 2019-07-31 RX ADMIN — PHENYLEPHRINE HYDROCHLORIDE 100 MCG: 10 INJECTION INTRAVENOUS at 00:06

## 2019-07-31 NOTE — OR NURSING
Patient arrived to Operating Room 8 with anesthesia team IRA aJck CRNA  Patient intubated, on continuous cardiac monitoring  Patient transferred from ICU bed to OR table  Patient positioned by [facility] staff and anesthesia  Patient was prepped and drapped in the usual sterile fashion

## 2019-07-31 NOTE — OR NURSING
All lines and drains removed at the [facility] from [facility]  Patient packaged appropriately  Security notified  No belongings present with patient  Patient moved from OR table to stretcher for transport to [place]  Patient is supine on stretched and confirmed by all staff

## 2019-07-31 NOTE — ANESTHESIA PREPROCEDURE EVALUATION
Review of Systems/Medical History          Cardiovascular   Pulmonary  Smoker cigarette smoker  , COPD , Asthma , poorly controlled ,        GI/Hepatic            Endo/Other     GYN       Hematology   Musculoskeletal       Neurology   Psychology           Physical Exam    Airway       Dental       Cardiovascular  Rhythm: regular, Rate: abnormal,     Pulmonary  Rhonchi,     Other Findings  On vent       Anesthesia Plan  ASA Score- 6     Anesthesia Type- general with ASA Monitors  Additional Monitors:   Airway Plan:     Comment: [facility]       Plan Factors-    Induction- intravenous  Postoperative Plan-     Informed Consent- Anesthetic plan and risks discussed with legal guardian

## 2019-07-31 NOTE — SOCIAL WORK
Met with the patients significant other today in the waiting room to discuss  homes in the area  He reports they plan to do a cremation and then send the remains back to [place] for services  Family has limited funds  - discussed services provided through the [facility], as well as [facility]  Provided him with my telephone number should ne need further assistance, but encouraged him to check in with the pts family today regarding the two above options, and to reach back to  if any further assistance neede

## 2019-08-01 LAB
ABO GROUP BLD BPU: NORMAL
BPU ID: NORMAL
CROSSMATCH: NORMAL
UNIT DISPENSE STATUS: NORMAL
UNIT PRODUCT CODE: NORMAL
UNIT RH: NORMAL

## 2019-08-02 LAB — BACTERIA BLD CULT: NORMAL

## 2019-08-03 LAB
BACTERIA BLD CULT: NORMAL
BACTERIA BLD CULT: NORMAL

## 2019-08-05 NOTE — ANESTHESIA POSTPROCEDURE EVALUATION
Post-Op Assessment Note          Staff: Anesthesiologist   Comments: organ harvest, patient           BP      Temp      Pulse     Resp      SpO2

## (undated) DEVICE — TELFA NON-ADHERENT ABSORBENT DRESSING: Brand: TELFA

## (undated) DEVICE — BASIC SINGLE BASIN-LF: Brand: MEDLINE INDUSTRIES, INC.

## (undated) DEVICE — MEDI-VAC YANKAUER SUCTION HANDLE W/BULBOUS AND CONTROL VENT: Brand: CARDINAL HEALTH

## (undated) DEVICE — UMBILICAL TAPE: Brand: DEROYAL

## (undated) DEVICE — BETHLEHEM UNIVERSAL LAPAROTOMY: Brand: CARDINAL HEALTH

## (undated) DEVICE — 3M™ IOBAN™ 2 ANTIMICROBIAL INCISE DRAPE 6650EZ: Brand: IOBAN™ 2

## (undated) DEVICE — TRU-CUT NEEDLE BIOPSY SOFT TISSUE 14G X 15CM: Brand: TRU-CUT

## (undated) DEVICE — LIGACLIP MCA MULTIPLE CLIP APPLIERS, 20 MEDIUM CLIPS: Brand: LIGACLIP

## (undated) DEVICE — VESSEL LOOP MAXI - RED

## (undated) DEVICE — PROXIMATE SKIN STAPLERS (35 WIDE) CONTAINS 35 STAINLESS STEEL STAPLES (FIXED HEAD): Brand: PROXIMATE

## (undated) DEVICE — INTENDED FOR TISSUE SEPARATION, AND OTHER PROCEDURES THAT REQUIRE A SHARP SURGICAL BLADE TO PUNCTURE OR CUT.: Brand: BARD-PARKER SAFETY BLADES SIZE 11, STERILE

## (undated) DEVICE — BONE WAX WHITE: Brand: BONE WAX WHITE

## (undated) DEVICE — DRAPE EQUIPMENT RF WAND

## (undated) DEVICE — SHROUD KIT ADULT DISP

## (undated) DEVICE — SUT SILK 0 30 IN A306H

## (undated) DEVICE — SUT SILK 2-0 30 IN A305H

## (undated) DEVICE — SURGICAL GOWN, XL SMARTSLEEVE: Brand: CONVERTORS

## (undated) DEVICE — POOLE SUCTION HANDLE: Brand: CARDINAL HEALTH

## (undated) DEVICE — INTENDED FOR TISSUE SEPARATION, AND OTHER PROCEDURES THAT REQUIRE A SHARP SURGICAL BLADE TO PUNCTURE OR CUT.: Brand: BARD-PARKER SAFETY BLADES SIZE 15, STERILE

## (undated) DEVICE — PENCIL ELECTROSURG E-Z CLEAN -0035H

## (undated) DEVICE — PAD GROUNDING ADULT

## (undated) DEVICE — TUBING SUCTION 5MM X 12 FT

## (undated) DEVICE — RADIOPAQUE LINE, SAFE ENTERAL CONNECTIONS: Brand: KANGAROO

## (undated) DEVICE — TRANSPOSAL ULTRAFLEX DUO/QUAD ULTRA CART MANIFOLD

## (undated) DEVICE — LIGACLIP MCA MULTIPLE CLIP APPLIERS, 20 LARGE CLIPS: Brand: LIGACLIP

## (undated) DEVICE — TOWEL SURG XR DETECT GREEN STRL RFD

## (undated) DEVICE — SPECIMEN CONTAINER STERILE PEEL PACK

## (undated) DEVICE — INTENDED FOR TISSUE SEPARATION, AND OTHER PROCEDURES THAT REQUIRE A SHARP SURGICAL BLADE TO PUNCTURE OR CUT.: Brand: BARD-PARKER SAFETY BLADES SIZE 10, STERILE

## (undated) DEVICE — CHLORAPREP HI-LITE 26ML ORANGE

## (undated) DEVICE — SUT SILK 3-0 30 IN A304H

## (undated) DEVICE — SPONGE LAP 18 X 18 IN STRL RFD

## (undated) DEVICE — SUT SILK 2-0 SH 30 IN K833H

## (undated) DEVICE — TIBURON LAPAROSCOPIC ABDOMINAL DRAPE: Brand: CONVERTORS